# Patient Record
Sex: FEMALE | Race: WHITE | NOT HISPANIC OR LATINO | Employment: OTHER | URBAN - METROPOLITAN AREA
[De-identification: names, ages, dates, MRNs, and addresses within clinical notes are randomized per-mention and may not be internally consistent; named-entity substitution may affect disease eponyms.]

---

## 2017-01-12 ENCOUNTER — GENERIC CONVERSION - ENCOUNTER (OUTPATIENT)
Dept: OTHER | Facility: OTHER | Age: 62
End: 2017-01-12

## 2017-02-16 ENCOUNTER — ANESTHESIA EVENT (OUTPATIENT)
Dept: PERIOP | Facility: HOSPITAL | Age: 62
DRG: 620 | End: 2017-02-16
Payer: COMMERCIAL

## 2017-02-21 ENCOUNTER — GENERIC CONVERSION - ENCOUNTER (OUTPATIENT)
Dept: OTHER | Facility: OTHER | Age: 62
End: 2017-02-21

## 2017-02-23 ENCOUNTER — ALLSCRIPTS OFFICE VISIT (OUTPATIENT)
Dept: OTHER | Facility: OTHER | Age: 62
End: 2017-02-23

## 2017-02-28 ENCOUNTER — ANESTHESIA (OUTPATIENT)
Dept: PERIOP | Facility: HOSPITAL | Age: 62
DRG: 620 | End: 2017-02-28
Payer: COMMERCIAL

## 2017-02-28 ENCOUNTER — HOSPITAL ENCOUNTER (INPATIENT)
Facility: HOSPITAL | Age: 62
LOS: 2 days | Discharge: HOME | DRG: 620 | End: 2017-03-02
Attending: SURGERY | Admitting: SURGERY
Payer: COMMERCIAL

## 2017-02-28 DIAGNOSIS — I10 ESSENTIAL HYPERTENSION: Primary | ICD-10-CM

## 2017-02-28 DIAGNOSIS — E66.01 MORBID (SEVERE) OBESITY DUE TO EXCESS CALORIES (HCC): ICD-10-CM

## 2017-02-28 DIAGNOSIS — E78.5 HYPERLIPIDEMIA: ICD-10-CM

## 2017-02-28 PROCEDURE — 0BUR4JZ SUPPLEMENT R DIAPHRAGM WITH SYNTH SUB, PERC ENDO APPROACH: ICD-10-PCS | Performed by: SURGERY

## 2017-02-28 PROCEDURE — 88307 TISSUE EXAM BY PATHOLOGIST: CPT | Performed by: SURGERY

## 2017-02-28 PROCEDURE — 0BUS4JZ SUPPLEMENT LEFT DIAPHRAGM WITH SYNTH SUB, PERC ENDO APPROACH: ICD-10-PCS | Performed by: SURGERY

## 2017-02-28 PROCEDURE — C1781 MESH (IMPLANTABLE): HCPCS | Performed by: SURGERY

## 2017-02-28 PROCEDURE — 0DB64Z3 EXCISION OF STOMACH, PERCUTANEOUS ENDOSCOPIC APPROACH, VERTICAL: ICD-10-PCS | Performed by: SURGERY

## 2017-02-28 PROCEDURE — 0DJ08ZZ INSPECTION OF UPPER INTESTINAL TRACT, VIA NATURAL OR ARTIFICIAL OPENING ENDOSCOPIC: ICD-10-PCS | Performed by: SURGERY

## 2017-02-28 DEVICE — MESH BIO-A MESH GORE: Type: IMPLANTABLE DEVICE | Site: ESOPHAGUS | Status: FUNCTIONAL

## 2017-02-28 RX ORDER — MORPHINE SULFATE 4 MG/ML
4 INJECTION, SOLUTION INTRAMUSCULAR; INTRAVENOUS EVERY 2 HOUR PRN
Status: DISCONTINUED | OUTPATIENT
Start: 2017-02-28 | End: 2017-03-02 | Stop reason: HOSPADM

## 2017-02-28 RX ORDER — SODIUM CHLORIDE 9 MG/ML
125 INJECTION, SOLUTION INTRAVENOUS CONTINUOUS
Status: DISCONTINUED | OUTPATIENT
Start: 2017-02-28 | End: 2017-02-28

## 2017-02-28 RX ORDER — BIOTIN 1 MG
2000 TABLET ORAL EVERY EVENING
COMMUNITY
End: 2018-03-05 | Stop reason: ALTCHOICE

## 2017-02-28 RX ORDER — ONDANSETRON 2 MG/ML
4 INJECTION INTRAMUSCULAR; INTRAVENOUS ONCE AS NEEDED
Status: COMPLETED | OUTPATIENT
Start: 2017-02-28 | End: 2017-02-28

## 2017-02-28 RX ORDER — SODIUM CHLORIDE, SODIUM LACTATE, POTASSIUM CHLORIDE, CALCIUM CHLORIDE 600; 310; 30; 20 MG/100ML; MG/100ML; MG/100ML; MG/100ML
125 INJECTION, SOLUTION INTRAVENOUS CONTINUOUS
Status: DISCONTINUED | OUTPATIENT
Start: 2017-02-28 | End: 2017-03-01

## 2017-02-28 RX ORDER — LIDOCAINE HYDROCHLORIDE 10 MG/ML
INJECTION, SOLUTION INFILTRATION; PERINEURAL AS NEEDED
Status: DISCONTINUED | OUTPATIENT
Start: 2017-02-28 | End: 2017-02-28 | Stop reason: SURG

## 2017-02-28 RX ORDER — OXYCODONE HCL 5 MG/5 ML
5 SOLUTION, ORAL ORAL EVERY 4 HOURS PRN
Status: DISCONTINUED | OUTPATIENT
Start: 2017-02-28 | End: 2017-03-02 | Stop reason: HOSPADM

## 2017-02-28 RX ORDER — METOCLOPRAMIDE HYDROCHLORIDE 5 MG/ML
INJECTION INTRAMUSCULAR; INTRAVENOUS AS NEEDED
Status: DISCONTINUED | OUTPATIENT
Start: 2017-02-28 | End: 2017-02-28 | Stop reason: SURG

## 2017-02-28 RX ORDER — BUPIVACAINE HYDROCHLORIDE AND EPINEPHRINE 5; 5 MG/ML; UG/ML
INJECTION, SOLUTION PERINEURAL AS NEEDED
Status: DISCONTINUED | OUTPATIENT
Start: 2017-02-28 | End: 2017-02-28 | Stop reason: HOSPADM

## 2017-02-28 RX ORDER — HYDROMORPHONE HYDROCHLORIDE 2 MG/ML
INJECTION, SOLUTION INTRAMUSCULAR; INTRAVENOUS; SUBCUTANEOUS AS NEEDED
Status: DISCONTINUED | OUTPATIENT
Start: 2017-02-28 | End: 2017-02-28 | Stop reason: SURG

## 2017-02-28 RX ORDER — ONDANSETRON 2 MG/ML
4 INJECTION INTRAMUSCULAR; INTRAVENOUS EVERY 4 HOURS PRN
Status: DISCONTINUED | OUTPATIENT
Start: 2017-02-28 | End: 2017-03-02 | Stop reason: HOSPADM

## 2017-02-28 RX ORDER — ROCURONIUM BROMIDE 10 MG/ML
INJECTION, SOLUTION INTRAVENOUS AS NEEDED
Status: DISCONTINUED | OUTPATIENT
Start: 2017-02-28 | End: 2017-02-28 | Stop reason: SURG

## 2017-02-28 RX ORDER — FENTANYL CITRATE/PF 50 MCG/ML
50 SYRINGE (ML) INJECTION
Status: DISCONTINUED | OUTPATIENT
Start: 2017-02-28 | End: 2017-02-28 | Stop reason: HOSPADM

## 2017-02-28 RX ORDER — OXYCODONE HCL 5 MG/5 ML
10 SOLUTION, ORAL ORAL EVERY 4 HOURS PRN
Status: DISCONTINUED | OUTPATIENT
Start: 2017-02-28 | End: 2017-03-02 | Stop reason: HOSPADM

## 2017-02-28 RX ORDER — PROMETHAZINE HYDROCHLORIDE 25 MG/ML
25 INJECTION, SOLUTION INTRAMUSCULAR; INTRAVENOUS EVERY 4 HOURS PRN
Status: DISCONTINUED | OUTPATIENT
Start: 2017-02-28 | End: 2017-03-02 | Stop reason: HOSPADM

## 2017-02-28 RX ORDER — PANTOPRAZOLE SODIUM 40 MG/1
40 INJECTION, POWDER, FOR SOLUTION INTRAVENOUS
Status: DISCONTINUED | OUTPATIENT
Start: 2017-03-01 | End: 2017-03-02 | Stop reason: HOSPADM

## 2017-02-28 RX ORDER — ACETAMINOPHEN 160 MG/5ML
320 SUSPENSION, ORAL (FINAL DOSE FORM) ORAL EVERY 4 HOURS PRN
Status: DISCONTINUED | OUTPATIENT
Start: 2017-02-28 | End: 2017-03-02 | Stop reason: HOSPADM

## 2017-02-28 RX ORDER — GLYCOPYRROLATE 0.2 MG/ML
INJECTION INTRAMUSCULAR; INTRAVENOUS AS NEEDED
Status: DISCONTINUED | OUTPATIENT
Start: 2017-02-28 | End: 2017-02-28 | Stop reason: SURG

## 2017-02-28 RX ORDER — ACETAMINOPHEN 160 MG/5ML
325 SUSPENSION, ORAL (FINAL DOSE FORM) ORAL EVERY 4 HOURS PRN
Status: DISCONTINUED | OUTPATIENT
Start: 2017-02-28 | End: 2017-03-02 | Stop reason: HOSPADM

## 2017-02-28 RX ORDER — FENTANYL CITRATE 50 UG/ML
INJECTION, SOLUTION INTRAMUSCULAR; INTRAVENOUS AS NEEDED
Status: DISCONTINUED | OUTPATIENT
Start: 2017-02-28 | End: 2017-02-28 | Stop reason: SURG

## 2017-02-28 RX ORDER — PROPOFOL 10 MG/ML
INJECTION, EMULSION INTRAVENOUS AS NEEDED
Status: DISCONTINUED | OUTPATIENT
Start: 2017-02-28 | End: 2017-02-28 | Stop reason: SURG

## 2017-02-28 RX ORDER — SCOLOPAMINE TRANSDERMAL SYSTEM 1 MG/1
1 PATCH, EXTENDED RELEASE TRANSDERMAL ONCE AS NEEDED
Status: DISCONTINUED | OUTPATIENT
Start: 2017-02-28 | End: 2017-02-28

## 2017-02-28 RX ORDER — ALBUTEROL SULFATE 2.5 MG/3ML
2.5 SOLUTION RESPIRATORY (INHALATION) ONCE AS NEEDED
Status: DISCONTINUED | OUTPATIENT
Start: 2017-02-28 | End: 2017-02-28 | Stop reason: HOSPADM

## 2017-02-28 RX ORDER — MAGNESIUM HYDROXIDE 1200 MG/15ML
LIQUID ORAL AS NEEDED
Status: DISCONTINUED | OUTPATIENT
Start: 2017-02-28 | End: 2017-02-28 | Stop reason: HOSPADM

## 2017-02-28 RX ORDER — MEPERIDINE HYDROCHLORIDE 50 MG/ML
12.5 INJECTION INTRAMUSCULAR; INTRAVENOUS; SUBCUTANEOUS AS NEEDED
Status: DISCONTINUED | OUTPATIENT
Start: 2017-02-28 | End: 2017-02-28 | Stop reason: HOSPADM

## 2017-02-28 RX ORDER — MORPHINE SULFATE 2 MG/ML
2 INJECTION, SOLUTION INTRAMUSCULAR; INTRAVENOUS EVERY 2 HOUR PRN
Status: DISCONTINUED | OUTPATIENT
Start: 2017-02-28 | End: 2017-03-02 | Stop reason: HOSPADM

## 2017-02-28 RX ORDER — MIDAZOLAM HYDROCHLORIDE 1 MG/ML
INJECTION INTRAMUSCULAR; INTRAVENOUS AS NEEDED
Status: DISCONTINUED | OUTPATIENT
Start: 2017-02-28 | End: 2017-02-28 | Stop reason: SURG

## 2017-02-28 RX ADMIN — HYDROMORPHONE HYDROCHLORIDE 0.4 MG: 2 INJECTION, SOLUTION INTRAMUSCULAR; INTRAVENOUS; SUBCUTANEOUS at 14:13

## 2017-02-28 RX ADMIN — OXYCODONE HYDROCHLORIDE 5 MG: 5 SOLUTION ORAL at 21:39

## 2017-02-28 RX ADMIN — CEFAZOLIN SODIUM 2000 MG: 2 SOLUTION INTRAVENOUS at 13:09

## 2017-02-28 RX ADMIN — DEXAMETHASONE SODIUM PHOSPHATE 4 MG: 10 INJECTION INTRAMUSCULAR; INTRAVENOUS at 13:07

## 2017-02-28 RX ADMIN — SODIUM CHLORIDE 125 ML/HR: 0.9 INJECTION, SOLUTION INTRAVENOUS at 12:40

## 2017-02-28 RX ADMIN — GLYCOPYRROLATE 0.1 MG: 0.2 INJECTION, SOLUTION INTRAMUSCULAR; INTRAVENOUS at 12:56

## 2017-02-28 RX ADMIN — GLYCOPYRROLATE 0.8 MG: 0.2 INJECTION, SOLUTION INTRAMUSCULAR; INTRAVENOUS at 14:31

## 2017-02-28 RX ADMIN — ROCURONIUM BROMIDE 20 MG: 10 INJECTION, SOLUTION INTRAVENOUS at 13:58

## 2017-02-28 RX ADMIN — MIDAZOLAM HYDROCHLORIDE 2 MG: 1 INJECTION, SOLUTION INTRAMUSCULAR; INTRAVENOUS at 12:53

## 2017-02-28 RX ADMIN — ACETAMINOPHEN 324.8 MG: 160 SUSPENSION ORAL at 21:39

## 2017-02-28 RX ADMIN — FENTANYL CITRATE 100 MCG: 50 INJECTION, SOLUTION INTRAMUSCULAR; INTRAVENOUS at 12:55

## 2017-02-28 RX ADMIN — HYDROMORPHONE HYDROCHLORIDE 0.4 MG: 2 INJECTION, SOLUTION INTRAMUSCULAR; INTRAVENOUS; SUBCUTANEOUS at 13:58

## 2017-02-28 RX ADMIN — METRONIDAZOLE 500 MG: 500 SOLUTION INTRAVENOUS at 13:13

## 2017-02-28 RX ADMIN — ENOXAPARIN SODIUM 40 MG: 40 INJECTION SUBCUTANEOUS at 12:21

## 2017-02-28 RX ADMIN — SCOPALAMINE 1 PATCH: 1 PATCH, EXTENDED RELEASE TRANSDERMAL at 12:22

## 2017-02-28 RX ADMIN — PROPOFOL 200 MG: 10 INJECTION, EMULSION INTRAVENOUS at 12:56

## 2017-02-28 RX ADMIN — NEOSTIGMINE METHYLSULFATE 4 MG: 1 INJECTION INTRAMUSCULAR; INTRAVENOUS; SUBCUTANEOUS at 14:33

## 2017-02-28 RX ADMIN — ROCURONIUM BROMIDE 50 MG: 10 INJECTION, SOLUTION INTRAVENOUS at 12:56

## 2017-02-28 RX ADMIN — SODIUM CHLORIDE, SODIUM LACTATE, POTASSIUM CHLORIDE, AND CALCIUM CHLORIDE 125 ML/HR: .6; .31; .03; .02 INJECTION, SOLUTION INTRAVENOUS at 15:47

## 2017-02-28 RX ADMIN — ONDANSETRON HYDROCHLORIDE 4 MG: 2 INJECTION, SOLUTION INTRAVENOUS at 14:28

## 2017-02-28 RX ADMIN — LIDOCAINE HYDROCHLORIDE 100 MG: 10 INJECTION, SOLUTION INFILTRATION; PERINEURAL at 12:56

## 2017-02-28 RX ADMIN — HYDROMORPHONE HYDROCHLORIDE 0.4 MG: 2 INJECTION, SOLUTION INTRAMUSCULAR; INTRAVENOUS; SUBCUTANEOUS at 13:32

## 2017-02-28 RX ADMIN — HYDROMORPHONE HYDROCHLORIDE 0.4 MG: 2 INJECTION, SOLUTION INTRAMUSCULAR; INTRAVENOUS; SUBCUTANEOUS at 14:23

## 2017-02-28 RX ADMIN — METOCLOPRAMIDE HYDROCHLORIDE 10 MG: 5 INJECTION INTRAMUSCULAR; INTRAVENOUS at 12:56

## 2017-02-28 RX ADMIN — FENTANYL CITRATE 50 MCG: 50 INJECTION INTRAMUSCULAR; INTRAVENOUS at 15:25

## 2017-03-01 PROBLEM — E66.01 MORBID OBESITY DUE TO EXCESS CALORIES (HCC): Status: ACTIVE | Noted: 2017-03-01

## 2017-03-01 LAB
ANION GAP SERPL CALCULATED.3IONS-SCNC: 10 MMOL/L (ref 4–13)
BUN SERPL-MCNC: 8 MG/DL (ref 5–25)
CALCIUM SERPL-MCNC: 8.4 MG/DL (ref 8.3–10.1)
CHLORIDE SERPL-SCNC: 103 MMOL/L (ref 100–108)
CO2 SERPL-SCNC: 26 MMOL/L (ref 21–32)
CREAT SERPL-MCNC: 0.53 MG/DL (ref 0.6–1.3)
ERYTHROCYTE [DISTWIDTH] IN BLOOD BY AUTOMATED COUNT: 12.3 % (ref 11.6–15.1)
GFR SERPL CREATININE-BSD FRML MDRD: >60 ML/MIN/1.73SQ M
GLUCOSE SERPL-MCNC: 116 MG/DL (ref 65–140)
HCT VFR BLD AUTO: 36.8 % (ref 34.8–46.1)
HGB BLD-MCNC: 12.6 G/DL (ref 11.5–15.4)
MCH RBC QN AUTO: 31.1 PG (ref 26.8–34.3)
MCHC RBC AUTO-ENTMCNC: 34.2 G/DL (ref 31.4–37.4)
MCV RBC AUTO: 91 FL (ref 82–98)
PLATELET # BLD AUTO: 222 THOUSANDS/UL (ref 149–390)
PMV BLD AUTO: 10.5 FL (ref 8.9–12.7)
POTASSIUM SERPL-SCNC: 3.6 MMOL/L (ref 3.5–5.3)
RBC # BLD AUTO: 4.05 MILLION/UL (ref 3.81–5.12)
SODIUM SERPL-SCNC: 139 MMOL/L (ref 136–145)
WBC # BLD AUTO: 9.51 THOUSAND/UL (ref 4.31–10.16)

## 2017-03-01 PROCEDURE — 85027 COMPLETE CBC AUTOMATED: CPT | Performed by: SURGERY

## 2017-03-01 PROCEDURE — 80048 BASIC METABOLIC PNL TOTAL CA: CPT | Performed by: SURGERY

## 2017-03-01 PROCEDURE — C9113 INJ PANTOPRAZOLE SODIUM, VIA: HCPCS | Performed by: SURGERY

## 2017-03-01 RX ORDER — HYDRALAZINE HYDROCHLORIDE 20 MG/ML
10 INJECTION INTRAMUSCULAR; INTRAVENOUS EVERY 6 HOURS PRN
Status: DISCONTINUED | OUTPATIENT
Start: 2017-03-01 | End: 2017-03-01

## 2017-03-01 RX ORDER — TAMSULOSIN HYDROCHLORIDE 0.4 MG/1
0.4 CAPSULE ORAL DAILY
Status: COMPLETED | OUTPATIENT
Start: 2017-03-01 | End: 2017-03-01

## 2017-03-01 RX ORDER — TAMSULOSIN HYDROCHLORIDE 0.4 MG/1
0.4 CAPSULE ORAL DAILY
Status: DISCONTINUED | OUTPATIENT
Start: 2017-03-02 | End: 2017-03-02

## 2017-03-01 RX ORDER — LABETALOL HYDROCHLORIDE 5 MG/ML
10 INJECTION, SOLUTION INTRAVENOUS EVERY 4 HOURS PRN
Status: DISCONTINUED | OUTPATIENT
Start: 2017-03-01 | End: 2017-03-02 | Stop reason: HOSPADM

## 2017-03-01 RX ADMIN — PANTOPRAZOLE SODIUM 40 MG: 40 INJECTION, POWDER, FOR SOLUTION INTRAVENOUS at 09:49

## 2017-03-01 RX ADMIN — OXYCODONE HYDROCHLORIDE 5 MG: 5 SOLUTION ORAL at 09:48

## 2017-03-01 RX ADMIN — OXYCODONE HYDROCHLORIDE 5 MG: 5 SOLUTION ORAL at 05:04

## 2017-03-01 RX ADMIN — ACETAMINOPHEN 325 MG: 160 SUSPENSION ORAL at 05:03

## 2017-03-01 RX ADMIN — ACETAMINOPHEN 325 MG: 160 SUSPENSION ORAL at 23:36

## 2017-03-01 RX ADMIN — SODIUM CHLORIDE, SODIUM LACTATE, POTASSIUM CHLORIDE, AND CALCIUM CHLORIDE 125 ML/HR: .6; .31; .03; .02 INJECTION, SOLUTION INTRAVENOUS at 06:38

## 2017-03-01 RX ADMIN — ENOXAPARIN SODIUM 40 MG: 40 INJECTION SUBCUTANEOUS at 09:48

## 2017-03-01 RX ADMIN — OXYCODONE HYDROCHLORIDE 5 MG: 5 SOLUTION ORAL at 18:08

## 2017-03-01 RX ADMIN — TAMSULOSIN HYDROCHLORIDE 0.4 MG: 0.4 CAPSULE ORAL at 09:50

## 2017-03-01 RX ADMIN — OXYCODONE HYDROCHLORIDE 5 MG: 5 SOLUTION ORAL at 23:36

## 2017-03-01 RX ADMIN — ACETAMINOPHEN 325 MG: 160 SUSPENSION ORAL at 18:08

## 2017-03-01 RX ADMIN — ACETAMINOPHEN 325 MG: 160 SUSPENSION ORAL at 09:48

## 2017-03-02 ENCOUNTER — GENERIC CONVERSION - ENCOUNTER (OUTPATIENT)
Dept: OTHER | Facility: OTHER | Age: 62
End: 2017-03-02

## 2017-03-02 VITALS
OXYGEN SATURATION: 95 % | SYSTOLIC BLOOD PRESSURE: 157 MMHG | DIASTOLIC BLOOD PRESSURE: 76 MMHG | HEIGHT: 62 IN | HEART RATE: 107 BPM | BODY MASS INDEX: 39.51 KG/M2 | TEMPERATURE: 97.6 F | WEIGHT: 214.7 LBS | RESPIRATION RATE: 18 BRPM

## 2017-03-02 PROCEDURE — 0T9B70Z DRAINAGE OF BLADDER WITH DRAINAGE DEVICE, VIA NATURAL OR ARTIFICIAL OPENING: ICD-10-PCS | Performed by: SURGERY

## 2017-03-02 PROCEDURE — C9113 INJ PANTOPRAZOLE SODIUM, VIA: HCPCS | Performed by: SURGERY

## 2017-03-02 RX ORDER — OMEPRAZOLE 20 MG/1
20 CAPSULE, DELAYED RELEASE ORAL DAILY
Qty: 30 CAPSULE | Refills: 0
Start: 2017-03-02 | End: 2018-09-12 | Stop reason: ALTCHOICE

## 2017-03-02 RX ORDER — OXYBUTYNIN CHLORIDE 5 MG/1
5 TABLET ORAL 2 TIMES DAILY
Status: DISCONTINUED | OUTPATIENT
Start: 2017-03-02 | End: 2017-03-02 | Stop reason: HOSPADM

## 2017-03-02 RX ORDER — OXYCODONE HYDROCHLORIDE AND ACETAMINOPHEN 5; 325 MG/1; MG/1
1 TABLET ORAL EVERY 4 HOURS PRN
Qty: 30 TABLET | Refills: 0
Start: 2017-03-02 | End: 2018-03-05 | Stop reason: ALTCHOICE

## 2017-03-02 RX ORDER — TAMSULOSIN HYDROCHLORIDE 0.4 MG/1
0.4 CAPSULE ORAL
Qty: 30 CAPSULE | Refills: 0 | Status: SHIPPED | OUTPATIENT
Start: 2017-03-02 | End: 2018-03-05 | Stop reason: SDUPTHER

## 2017-03-02 RX ORDER — TAMSULOSIN HYDROCHLORIDE 0.4 MG/1
0.4 CAPSULE ORAL DAILY
Status: COMPLETED | OUTPATIENT
Start: 2017-03-02 | End: 2017-03-02

## 2017-03-02 RX ORDER — OXYBUTYNIN CHLORIDE 5 MG/1
10 TABLET, EXTENDED RELEASE ORAL DAILY
Status: DISCONTINUED | OUTPATIENT
Start: 2017-03-02 | End: 2017-03-02

## 2017-03-02 RX ORDER — TAMSULOSIN HYDROCHLORIDE 0.4 MG/1
0.4 CAPSULE ORAL DAILY
Status: DISCONTINUED | OUTPATIENT
Start: 2017-03-02 | End: 2017-03-02

## 2017-03-02 RX ORDER — OXYBUTYNIN CHLORIDE 5 MG/1
5 TABLET ORAL 2 TIMES DAILY
Qty: 60 TABLET | Refills: 0 | Status: SHIPPED | OUTPATIENT
Start: 2017-03-02 | End: 2017-03-02 | Stop reason: HOSPADM

## 2017-03-02 RX ADMIN — ENOXAPARIN SODIUM 40 MG: 40 INJECTION SUBCUTANEOUS at 08:38

## 2017-03-02 RX ADMIN — TAMSULOSIN HYDROCHLORIDE 0.4 MG: 0.4 CAPSULE ORAL at 08:38

## 2017-03-02 RX ADMIN — OXYBUTYNIN CHLORIDE 5 MG: 5 TABLET ORAL at 14:57

## 2017-03-02 RX ADMIN — OXYCODONE HYDROCHLORIDE 5 MG: 5 SOLUTION ORAL at 08:56

## 2017-03-02 RX ADMIN — ACETAMINOPHEN 325 MG: 160 SUSPENSION ORAL at 08:56

## 2017-03-02 RX ADMIN — PANTOPRAZOLE SODIUM 40 MG: 40 INJECTION, POWDER, FOR SOLUTION INTRAVENOUS at 08:38

## 2017-03-03 ENCOUNTER — GENERIC CONVERSION - ENCOUNTER (OUTPATIENT)
Dept: OTHER | Facility: OTHER | Age: 62
End: 2017-03-03

## 2017-03-09 ENCOUNTER — ALLSCRIPTS OFFICE VISIT (OUTPATIENT)
Dept: OTHER | Facility: OTHER | Age: 62
End: 2017-03-09

## 2017-04-11 ENCOUNTER — GENERIC CONVERSION - ENCOUNTER (OUTPATIENT)
Dept: OTHER | Facility: OTHER | Age: 62
End: 2017-04-11

## 2017-06-13 ENCOUNTER — ALLSCRIPTS OFFICE VISIT (OUTPATIENT)
Dept: OTHER | Facility: OTHER | Age: 62
End: 2017-06-13

## 2017-06-13 DIAGNOSIS — Z98.84 BARIATRIC SURGERY STATUS: ICD-10-CM

## 2017-06-13 DIAGNOSIS — K91.2 POSTSURGICAL MALABSORPTION, NOT ELSEWHERE CLASSIFIED: ICD-10-CM

## 2017-06-13 DIAGNOSIS — E78.5 HYPERLIPIDEMIA: ICD-10-CM

## 2017-06-13 DIAGNOSIS — E66.9 OBESITY: ICD-10-CM

## 2017-06-13 DIAGNOSIS — M81.0 AGE-RELATED OSTEOPOROSIS WITHOUT CURRENT PATHOLOGICAL FRACTURE: ICD-10-CM

## 2017-06-13 DIAGNOSIS — E55.9 VITAMIN D DEFICIENCY: ICD-10-CM

## 2017-07-15 ENCOUNTER — ALLSCRIPTS OFFICE VISIT (OUTPATIENT)
Dept: OTHER | Facility: OTHER | Age: 62
End: 2017-07-15

## 2017-07-15 ENCOUNTER — LAB REQUISITION (OUTPATIENT)
Dept: LAB | Facility: HOSPITAL | Age: 62
End: 2017-07-15
Payer: COMMERCIAL

## 2017-07-15 DIAGNOSIS — J02.9 ACUTE PHARYNGITIS: ICD-10-CM

## 2017-07-15 LAB — S PYO AG THROAT QL: NEGATIVE

## 2017-07-15 PROCEDURE — 87070 CULTURE OTHR SPECIMN AEROBIC: CPT | Performed by: FAMILY MEDICINE

## 2017-07-17 LAB — BACTERIA THROAT CULT: NORMAL

## 2017-08-17 ENCOUNTER — APPOINTMENT (OUTPATIENT)
Dept: LAB | Facility: CLINIC | Age: 62
End: 2017-08-17
Payer: COMMERCIAL

## 2017-08-17 PROCEDURE — 82306 VITAMIN D 25 HYDROXY: CPT

## 2017-08-17 PROCEDURE — 85027 COMPLETE CBC AUTOMATED: CPT

## 2017-08-17 PROCEDURE — 84425 ASSAY OF VITAMIN B-1: CPT

## 2017-08-17 PROCEDURE — 80061 LIPID PANEL: CPT

## 2017-08-17 PROCEDURE — 82607 VITAMIN B-12: CPT

## 2017-08-17 PROCEDURE — 82746 ASSAY OF FOLIC ACID SERUM: CPT

## 2017-08-17 PROCEDURE — 84590 ASSAY OF VITAMIN A: CPT

## 2017-08-17 PROCEDURE — 80053 COMPREHEN METABOLIC PANEL: CPT

## 2017-08-17 PROCEDURE — 82728 ASSAY OF FERRITIN: CPT

## 2017-08-17 PROCEDURE — 36415 COLL VENOUS BLD VENIPUNCTURE: CPT

## 2017-08-17 PROCEDURE — 83970 ASSAY OF PARATHORMONE: CPT

## 2017-08-29 ENCOUNTER — GENERIC CONVERSION - ENCOUNTER (OUTPATIENT)
Dept: OTHER | Facility: OTHER | Age: 62
End: 2017-08-29

## 2017-09-08 ENCOUNTER — GENERIC CONVERSION - ENCOUNTER (OUTPATIENT)
Dept: OTHER | Facility: OTHER | Age: 62
End: 2017-09-08

## 2017-09-13 ENCOUNTER — ALLSCRIPTS OFFICE VISIT (OUTPATIENT)
Dept: OTHER | Facility: OTHER | Age: 62
End: 2017-09-13

## 2017-09-21 ENCOUNTER — TRANSCRIBE ORDERS (OUTPATIENT)
Dept: ADMINISTRATIVE | Facility: HOSPITAL | Age: 62
End: 2017-09-21

## 2017-09-21 DIAGNOSIS — Z12.31 ENCOUNTER FOR SCREENING MAMMOGRAM FOR MALIGNANT NEOPLASM OF BREAST: ICD-10-CM

## 2017-09-21 DIAGNOSIS — Z12.31 ENCOUNTER FOR SCREENING MAMMOGRAM FOR MALIGNANT NEOPLASM OF BREAST: Primary | ICD-10-CM

## 2017-10-26 ENCOUNTER — HOSPITAL ENCOUNTER (OUTPATIENT)
Dept: BONE DENSITY | Facility: IMAGING CENTER | Age: 62
Discharge: HOME | End: 2017-10-26
Payer: COMMERCIAL

## 2017-10-26 DIAGNOSIS — Z12.31 ENCOUNTER FOR SCREENING MAMMOGRAM FOR MALIGNANT NEOPLASM OF BREAST: ICD-10-CM

## 2017-10-26 PROCEDURE — G0202 SCR MAMMO BI INCL CAD: HCPCS

## 2017-11-09 ENCOUNTER — LAB REQUISITION (OUTPATIENT)
Dept: LAB | Facility: HOSPITAL | Age: 62
End: 2017-11-09
Payer: COMMERCIAL

## 2017-11-09 ENCOUNTER — GENERIC CONVERSION - ENCOUNTER (OUTPATIENT)
Dept: OTHER | Facility: OTHER | Age: 62
End: 2017-11-09

## 2017-11-09 DIAGNOSIS — N39.0 URINARY TRACT INFECTION: ICD-10-CM

## 2017-11-09 LAB
BILIRUB UR QL STRIP: NORMAL
CLARITY UR: NORMAL
COLOR UR: YELLOW
GLUCOSE (HISTORICAL): NORMAL
HGB UR QL STRIP.AUTO: NORMAL
KETONES UR STRIP-MCNC: NORMAL MG/DL
LEUKOCYTE ESTERASE UR QL STRIP: NORMAL
NITRITE UR QL STRIP: NORMAL
PH UR STRIP.AUTO: 6 [PH]
PROT UR STRIP-MCNC: NORMAL MG/DL
SP GR UR STRIP.AUTO: 1.01
UROBILINOGEN UR QL STRIP.AUTO: 0.2

## 2017-11-09 PROCEDURE — 87086 URINE CULTURE/COLONY COUNT: CPT | Performed by: NURSE PRACTITIONER

## 2017-11-09 PROCEDURE — 87186 SC STD MICRODIL/AGAR DIL: CPT | Performed by: NURSE PRACTITIONER

## 2017-11-09 PROCEDURE — 87077 CULTURE AEROBIC IDENTIFY: CPT | Performed by: NURSE PRACTITIONER

## 2017-11-11 LAB — BACTERIA UR CULT: ABNORMAL

## 2018-01-10 NOTE — MISCELLANEOUS
Message   Recorded as Task   Date: 01/29/2016 08:07 AM, Created By: Rose Mary Rehman   Task Name: Call Back   Assigned To: Jose Matthew   Regarding Patient: Christine Bond, Status: Active   CommentCarmon Schoenchen - 29 Jan 2016 8:07 AM     TASK CREATED  Please call patient  Her bone density shows worsening of the density of her bones  She needs an office visit with me or Nataliia Sagastume or any provider to discuss treatment options     Mala Martinez - 29 Jan 2016 12:03 PM     TASK REPLIED TO: Previously Assigned To Mala Martinez  Pt was imformed and an appt was made with Emily Holbrook on 02/01/2016 @ 10:15am  MRP        Signatures   Electronically signed by : Pattie Santoyo DO; Jan 30 2016  5:38PM EST                       (Author)

## 2018-01-10 NOTE — PROGRESS NOTES
Chief Complaint  Chief Complaint Free Text Note Form: Patient was called for her pre-operative check-in  She verbalized no concerns or questions at this time related to implementing behaviors before and after surgery  She reports compliancy with her recommended weight loss goals  She verbalized a positive support system, but states it will be difficult at times due to being her 's caretaker  She has begun meal planning for after surgery  Verbalized many health related motivations for sticking to her new learned behaviors  She appeared anxious about the surgery  Her fears were normalized and assuaged by reiterating the surgeon's expert skills  Counselor helped her reduce negative thoughts contributing to her anxiety  She states she will email her dietitian with dietary questions related to going on vacation in May  She was encouraged to check-in at any time with behavioral, dietary, or medical concerns for continued support throughout this process  Active Problems    1  Allergic rhinitis (477 9) (J30 9)   2  Annual physical exam (V70 0) (Z00 00)   3  Blood tests prior to treatment or procedure (V72 63) (Z01 812)   4  Closed Infrasyndesmotic Fracture Of The Left Lateral Malleolus With Posterior Medial   Fracture (Crispin-Mathew A3) (824 6)   5  Colon cancer screening (V76 51) (Z12 11)   6  Edema (782 3) (R60 9)   7  Elevated blood pressure reading without diagnosis of hypertension (796 2) (R03 0)   8  Encounter for routine gynecological examination (V72 31) (Z01 419)   9  Encounter for screening colonoscopy (V76 51) (Z12 11)   10  Encounter for screening mammogram for malignant neoplasm of breast (V76 12)    (Z12 31)   11  Generalized osteoarthritis of multiple sites (715 09) (M15 9)   12  Hyperlipidemia (272 4) (E78 5)   13  Morbid obesity (278 01) (E66 01)   14  Need for prophylactic vaccination and inoculation against influenza (V04 81) (Z23)   15  Osteoarthritis of knee (715 36) (M17 9)   16  Osteoporosis (733 00) (M81 0)   17  Other urinary incontinence (788 39) (N39 498)   18  Pes planus, unspecified laterality (734) (M21 40)   19  Preop cardiovascular exam (V72 81) (Z01 810)   20  Rosacea (695 3) (L71 9)   21  Sciatic pain, left (724 3) (M54 32)   22  Screening for osteoporosis (V82 81) (Z13 820)   23  Screening for tuberculosis (V74 1) (Z11 1)   24  Strain of thoracic region, initial encounter (847 1) (S29 019A)   25  Strain, back (847 9) (S39 012A)   26  Urinary tract infection (599 0) (N39 0)   27  Vitamin D deficiency (268 9) (E55 9)    Past Medical History    1  History of Acute bronchitis (466 0) (J20 9)   2  Acute sinusitis (461 9) (J01 90)   3  History of Acute upper respiratory infection (465 9) (J06 9)   4  History of Chronic Primary Lymphadenitis (289 1)   5  History of acute pharyngitis (V12 69) (Z87 09)   6  History of acute sinusitis (V12 69) (Z87 09)   7  History of osteoporosis (V13 59) (Z87 39)   8  History of Hordeolum externum, unspecified laterality (373 11) (H00 019)   9  Need for prophylactic vaccination and inoculation against influenza (V04 81) (Z23)   10  History of Synovial Cyst (727 40)   11  History of Unspecified Muscle Strain (848 9)   12  History of Urinary Tract Infection (V13 02)    Surgical History    1  History of Foot Surgery   2  History of Knee Surgery   3  History of Neuroplasty Decompression Median Nerve At Carpal Tunnel   4  History of Rotator Cuff Repair   5  History of Salpingo-oophorectomy Bilateral Removal Of Solitary Ovary And Tube    Family History  Mother    1  Family history of Mother  At Age 76  Father    2  Family history of Father  At Age 60   1  Family history of Heart Disease (V17 49)  Family History    4  Family history of Cancer   5  Family history of Endometriosis   6   Family history of Osteoporosis (V17 81)    Social History    · Alcohol Use (History)   · Caffeine use (V49 89) (F15 90)   · Former smoker (I36 89) (K02 597)   · No drug use   · Denied: History of Uses Safety Equipment   · Uses Safety Equipment - Seatbelts    Current Meds   1  Allegra Allergy 180 MG Oral Tablet; TAKE 1 TABLET DAILY AS NEEDED FOR ALLERGIES; Therapy: 88NFH2799 to Recorded   2  Biotin 5000 MCG Oral Capsule; Therapy: (Recorded:28Nov2016) to Recorded   3  Caltrate 600+D TABS; Therapy: (Recorded:28Nov2016) to Recorded   4  Cranberry TABS; Therapy: (Recorded:28Nov2016) to Recorded   5  HydroCHLOROthiazide 12 5 MG Oral Capsule; TAKE ONE CAPSULE BY MOUTH EVERY   DAY; Therapy: 59HFH9348 to (Evaluate:11Aug2017)  Requested for: 11VMX4386; Last   Rx:13Jan2017 Ordered   6  Multivitamins CAPS; Therapy: (Recorded:28Nov2016) to Recorded   7  Orphenadrine Citrate  MG Oral Tablet Extended Release 12 Hour; TAKE 1   TABLET TWICE DAILY AS NEEDED; Therapy: 91PKX1533 to (Evaluate:76Fqn5200)  Requested for: 52ZTY3428; Last   Rx:28Nov2016 Ordered   8  Oxybutynin Chloride ER 10 MG Oral Tablet Extended Release 24 Hour; Take 1 tablet   daily; Therapy: 22UNN6543 to (Evaluate:10Qxa6939)  Requested for: 76NUL5370; Last   Rx:23Epa0895 Ordered   9  Oxycodone-Acetaminophen 5-325 MG Oral Tablet; TAKE 1 TABLET EVERY 4 TO 6   HOURS AS NEEDED FOR PAIN;   Therapy: 35Iga2250 to (Complete:34Mpw7260); Last Rx:29Eky7564 Ordered   10  Reclast 5 MG/100ML Intravenous Solution; Therapy: (Recorded:08Lcr3008) to Recorded   11  Saline Nasal Spray 0 65 % Nasal Solution; 2 spray each nostril twice a day; Therapy: 49QCT5597 to (Last Rx:21Nov2015) Ordered   12  Vitamin D3 1000 UNIT Oral Tablet; Therapy: (Recorded:28Nov2016) to Recorded    Allergies    1  Aspirin TABS   2  Penicillins    3  No Known Environmental Allergies   4  No Known Food Allergies    Future Appointments    Date/Time Provider Specialty Site   02/23/2017 10:15 AM [facility]EP Duff  [facility]   03/09/2017 09:45 AM PARDEEP Duff   [facility]     Signatures   Electronically signed by : Clifford Elizabeth, ; Feb 21 2017 11:03AM EST                       (Author)

## 2018-01-11 NOTE — RESULT NOTES
Dear Dav Grady,   Your test results have returned and are listed below:      Discussion/Summary  Your results have some minor abnormalities, but nothing that is concerning  Please keep your regularly scheduled follow-up appointment  If you do not have a follow-up scheduled, please call the office to schedule a follow-up visit  If you have any questions, please don't hesitate to call the office  Sincerely,      Signatures   Electronically signed by : VALENTINO Sauer RD;  Dec  6 2016 11:18AM EST                       (Author)    Electronically signed by : PARDEEP Pastrana ; Dec  8 2016  3:44PM EST                       (Validation)

## 2018-01-12 VITALS
TEMPERATURE: 98.3 F | BODY MASS INDEX: 34.01 KG/M2 | HEIGHT: 62 IN | DIASTOLIC BLOOD PRESSURE: 72 MMHG | RESPIRATION RATE: 16 BRPM | SYSTOLIC BLOOD PRESSURE: 104 MMHG | HEART RATE: 76 BPM | WEIGHT: 184.8 LBS

## 2018-01-12 VITALS
WEIGHT: 189.5 LBS | DIASTOLIC BLOOD PRESSURE: 80 MMHG | SYSTOLIC BLOOD PRESSURE: 122 MMHG | RESPIRATION RATE: 22 BRPM | BODY MASS INDEX: 34.87 KG/M2 | TEMPERATURE: 98 F | HEIGHT: 62 IN | HEART RATE: 80 BPM

## 2018-01-12 NOTE — PROGRESS NOTES
Assessment    1  Morbid obesity (278 01) (E66 01)    Discussion/Summary    Doing well post op  No major issues  The low molecular weight heparin prophylactic treatment will be completed in a couple days  The pathology report was reviewed with the patient and the results were within normal limits  Increase physical activity as tolerated and as instructed  Advance diet as indicated in the binder  Follow up in one month as scheduled  Chief Complaint  Patient in office today for 1st post/sleeve  She is tolerating her diet  She has a follow up appointment with urology due to issues emptying bladder  Post-Op  Post-Op Bariatric Surgery:   Yolette Richmond is status post lap sleeve procedure, performed on 2/28/2017  HPI: today's weight is 209 pounds and herpre-op weight was 224 pounds  The patient reports a loss of appetite, but not experiencing hunger, no nausea, no vomiting, no dyspnea, no fever, no constipation, no diarrhea, not experiencing dumping syndrome, no chest pain, no abdominal pain, no excessive pain, no swelling and no abnormal bleeding  Diet and Exercise: Diet history reviewed and discussed with the patient  Weight loss/gains to date discussed with the patient  She reports no carbonated beverage consumption and no regular exercise  Supplements: multivitamins, B-12, calcium and iron  PE:   Abdominal exam: soft, nontender, normal bowel sounds, no rebound tenderness, no guarding, no incisional hernia and no palpable mass  The surgical incision site is clean, dry and intact, not warm, not indurated, not erythematous, not ecchymotic, not swollen and not tender  Assessment:   Post-op, the patient is doing well  Plan: Activity restrictions: None     Instructions / Recommendations: dietary counseling recommended, recommended a daily protein intake of  grams, vitamin supplement(s) recommended, mineral supplement(s) recommended, recommended exercising at least 150 minutes per week, behavior modifications recommended, participating in a multi-disciplinary approach with her primary care provider and endocrinology or gastroenterology recommended and instructed to call the office for concerns, questions, or problems  The patient was instructed to follow up in 4 weeks  Active Problems    1  Allergic rhinitis (477 9) (J30 9)   2  Annual physical exam (V70 0) (Z00 00)   3  Blood tests prior to treatment or procedure (V72 63) (Z01 812)   4  Closed Infrasyndesmotic Fracture Of The Left Lateral Malleolus With Posterior Medial   Fracture (Crispin-Mathew A3) (824 6)   5  Colon cancer screening (V76 51) (Z12 11)   6  Edema (782 3) (R60 9)   7  Elevated BP without diagnosis of hypertension (796 2) (R03 0)   8  Encounter for routine gynecological examination (V72 31) (Z01 419)   9  Encounter for screening colonoscopy (V76 51) (Z12 11)   10  Encounter for screening mammogram for malignant neoplasm of breast (V76 12)    (Z12 31)   11  Generalized osteoarthritis of multiple sites (715 09) (M15 9)   12  Hyperlipidemia (272 4) (E78 5)   13  Morbid obesity (278 01) (E66 01)   14  Need for prophylactic vaccination and inoculation against influenza (V04 81) (Z23)   15  Osteoarthritis of knee (715 36) (M17 9)   16  Osteoporosis (733 00) (M81 0)   17  Other urinary incontinence (788 39) (N39 498)   18  Pes planus, unspecified laterality (734) (M21 40)   19  Preop cardiovascular exam (V72 81) (Z01 810)   20  Rosacea (695 3) (L71 9)   21  Sciatic pain, left (724 3) (M54 32)   22  Screening for osteoporosis (V82 81) (Z13 820)   23  Screening for tuberculosis (V74 1) (Z11 1)   24  Strain of thoracic region, initial encounter (847 1) (S29 019A)   25  Strain, back (847 9) (S39 012A)   26  Urinary tract infection (599 0) (N39 0)   27   Vitamin D deficiency (268 9) (E55 9)    Social History    · Alcohol Use (History)   · minimal to none   · Caffeine use (V49 89) (F15 90)   · 2-3 cups iced tea   · Former smoker (V15 82) (W49 347)   · No drug use   · Denied: History of Uses Safety Equipment   · smoke detectors   · Uses Safety Equipment - Seatbelts    Current Meds   1  Allegra Allergy 180 MG Oral Tablet; TAKE 1 TABLET DAILY AS NEEDED FOR ALLERGIES; Therapy: 10OJN9479 to Recorded   2  Bariatric Fusion Oral Tablet Chewable; Therapy: (Recorded:09Mar2017) to Recorded   3  Biotin 5000 MCG Oral Capsule; Therapy: (Recorded:28Nov2016) to Recorded   4  Calcium CHEW;   Therapy: (Recorded:09Mar2017) to Recorded   5  Enoxaparin Sodium 40 MG/0 4ML Subcutaneous Solution; inject once daily for 2 weeks   post surgery; Therapy: 88JYU0217 to (Last Rx:58Qnb1558)  Requested for: 72Lft5454 Ordered   6  Omeprazole 20 MG Oral Capsule Delayed Release; take 1 capsule daily; Therapy: 65UBZ6682 to (Evaluate:23Jun2017)  Requested for: 23Wvz7718; Last   Rx:23Feb2017 Ordered   7  Reclast 5 MG/100ML Intravenous Solution; Therapy: (Recorded:15Sep2016) to Recorded   8  Saline Nasal Spray 0 65 % Nasal Solution; 2 spray each nostril twice a day; Therapy: 41YTP2069 to (Last Rx:21Nov2015) Ordered   9  Vitamin D3 1000 UNIT Oral Tablet; Therapy: (Recorded:28Nov2016) to Recorded    Allergies    1  Aspirin TABS   2  Penicillins    3  No Known Environmental Allergies   4  No Known Food Allergies    Vitals   Recorded: 33WNI2824 09:46AM   Temperature 98 F   Heart Rate 76   Respiration 22   Systolic 240   Diastolic 70   Height 5 ft 2 in   Weight 209 lb 8 0 oz   BMI Calculated 38 32   BSA Calculated 1 95     Future Appointments    Date/Time Provider Specialty Site   03/13/2017 08:45 AM Sharry Goldberg, M D   [facility]     Signatures   Electronically signed by : PARDEEP Correa ; Mar  9 2017 10:04AM EST                       (Author)

## 2018-01-13 VITALS — BODY MASS INDEX: 41.04 KG/M2 | WEIGHT: 223 LBS | HEIGHT: 62 IN

## 2018-01-13 VITALS
BODY MASS INDEX: 38.55 KG/M2 | HEART RATE: 76 BPM | SYSTOLIC BLOOD PRESSURE: 110 MMHG | TEMPERATURE: 98 F | DIASTOLIC BLOOD PRESSURE: 70 MMHG | WEIGHT: 209.5 LBS | HEIGHT: 62 IN | RESPIRATION RATE: 22 BRPM

## 2018-01-13 NOTE — PROGRESS NOTES
Discussion/Summary  Discussion Summary:   Patient attended 5 week post op class  Reviewed diet progression, vitamin and mineral recommendations, 30/60 rules, volume of foods, protein supplements, hydration needs, antacid guidelines, recommendation to f/u with pcp concerning med adjustments, exercise guidelines, hair shedding, contraception guidelines, constipation prevention and treatment, alcohol avoidance and recommendations of when to call the office  Patient was also weighed and filled out form for program   Time was left for discussion and to answer questions  Active Problems    1  Allergic rhinitis (477 9) (J30 9)   2  Annual physical exam (V70 0) (Z00 00)   3  Blood tests prior to treatment or procedure (V72 63) (Z01 812)   4  Closed Infrasyndesmotic Fracture Of The Left Lateral Malleolus With Posterior Medial   Fracture (Crispin-Mathew A3) (824 6)   5  Colon cancer screening (V76 51) (Z12 11)   6  Edema (782 3) (R60 9)   7  Elevated BP without diagnosis of hypertension (796 2) (R03 0)   8  Encounter for routine gynecological examination (V72 31) (Z01 419)   9  Encounter for screening colonoscopy (V76 51) (Z12 11)   10  Encounter for screening mammogram for malignant neoplasm of breast (V76 12)    (Z12 31)   11  Generalized osteoarthritis of multiple sites (715 09) (M15 9)   12  Hyperlipidemia (272 4) (E78 5)   13  Morbid obesity (278 01) (E66 01)   14  Need for prophylactic vaccination and inoculation against influenza (V04 81) (Z23)   15  Osteoarthritis of knee (715 36) (M17 10)   16  Osteoporosis (733 00) (M81 0)   17  Other urinary incontinence (788 39) (N39 498)   18  Pes planus, unspecified laterality (734) (M21 40)   19  Preop cardiovascular exam (V72 81) (Z01 810)   20  Rosacea (695 3) (L71 9)   21  Sciatic pain, left (724 3) (M54 32)   22  Screening for osteoporosis (V82 81) (Z13 820)   23  Screening for tuberculosis (V74 1) (Z11 1)   24   Status post laparoscopic sleeve gastrectomy (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 86) (Z98 84)   25  Strain of thoracic region, initial encounter (847 1) (S29 019A)   26  Strain, back (847 9) (S39 012A)   27  Urinary tract infection (599 0) (N39 0)   28  Vitamin D deficiency (268 9) (E55 9)    Past Medical History    1  History of Acute bronchitis (466 0) (J20 9)   2  Acute sinusitis (461 9) (J01 90)   3  History of Acute upper respiratory infection (465 9) (J06 9)   4  History of Chronic Primary Lymphadenitis (289 1)   5  History of acute pharyngitis (V12 69) (Z87 09)   6  History of acute sinusitis (V12 69) (Z87 09)   7  History of osteoporosis (V13 59) (Z87 39)   8  History of Hordeolum externum, unspecified laterality (373 11) (H00 019)   9  Need for prophylactic vaccination and inoculation against influenza (V04 81) (Z23)   10  History of Synovial Cyst (727 40)   11  History of Unspecified Muscle Strain (848 9)   12  History of Urinary Tract Infection (V13 02)    Surgical History    1  History of Foot Surgery   2  History of Gastrectomy Sleeve Laparoscopic   3  History of Knee Surgery   4  History of Neuroplasty Decompression Median Nerve At Carpal Tunnel   5  History of Rotator Cuff Repair   6  History of Salpingo-oophorectomy Bilateral Removal Of Solitary Ovary And Tube    Family History  Mother    1  Family history of Mother  At Age 76  Father    2  Family history of Father  At Age 60   1  Family history of Heart Disease (V17 49)  Family History    4  Family history of Cancer   5  Family history of Endometriosis   6  Family history of Osteoporosis (V17 81)    Social History    · Alcohol Use (History)   · Caffeine use (V49 89) (F15 90)   · Former smoker (V15 82) (Z87 891)   · No drug use   · Denied: History of Uses Safety Equipment   · Uses Safety Equipment - Seatbelts    Current Meds   1  Allegra Allergy 180 MG Oral Tablet; TAKE 1 TABLET DAILY AS NEEDED FOR ALLERGIES; Therapy: 66XSX8761 to Recorded   2  Bariatric Fusion Oral Tablet Chewable;    Therapy: (Recorded:09Mar2017) to Recorded   3  Biotin 5000 MCG Oral Capsule; Therapy: (Recorded:28Nov2016) to Recorded   4  Calcium CHEW;   Therapy: (Recorded:09Mar2017) to Recorded   5  Enoxaparin Sodium 40 MG/0 4ML Subcutaneous Solution; inject once daily for 2 weeks   post surgery; Therapy: 26UZX5460 to (Last Rx:16Uvr4047)  Requested for: 11Ssi6885 Ordered   6  Omeprazole 20 MG Oral Capsule Delayed Release; take 1 capsule daily; Therapy: 63EIR8288 to (Evaluate:23Jun2017)  Requested for: 23Feb2017; Last   Rx:17Nwe7930 Ordered   7  Reclast 5 MG/100ML Intravenous Solution; Therapy: (Recorded:15Sep2016) to Recorded   8  Saline Nasal Spray 0 65 % Nasal Solution; 2 spray each nostril twice a day; Therapy: 05SNV9224 to (Last Rx:21Nov2015) Ordered   9  Vitamin D3 1000 UNIT Oral Tablet; Therapy: (Recorded:28Nov2016) to Recorded    Allergies    1  Aspirin TABS   2  Penicillins    3  No Known Environmental Allergies   4   No Known Food Allergies    Vitals  Signs   Recorded: 11Apr2017 10:02AM   Height: 5 ft 2 in  Weight: 202 lb 12 8 oz  BMI Calculated: 37 09  BSA Calculated: 1 92    Future Appointments    Date/Time Provider Specialty Site   06/13/2017 09:30 AM Erika Karimi, [facility]     Signatures   Electronically signed by : VALENTINO Leo RD; Apr 11 2017 10:02AM EST                       (Author)    Electronically signed by : PARDEEP Wheatley ; Apr 13 2017 12:16PM EST                       (Validation)

## 2018-01-14 VITALS
RESPIRATION RATE: 13 BRPM | TEMPERATURE: 97.3 F | HEART RATE: 62 BPM | BODY MASS INDEX: 33.49 KG/M2 | DIASTOLIC BLOOD PRESSURE: 72 MMHG | HEIGHT: 62 IN | WEIGHT: 182 LBS | SYSTOLIC BLOOD PRESSURE: 116 MMHG

## 2018-01-14 VITALS — WEIGHT: 202.8 LBS | BODY MASS INDEX: 37.32 KG/M2 | HEIGHT: 62 IN

## 2018-01-15 VITALS
BODY MASS INDEX: 40.48 KG/M2 | DIASTOLIC BLOOD PRESSURE: 70 MMHG | WEIGHT: 220 LBS | HEART RATE: 72 BPM | SYSTOLIC BLOOD PRESSURE: 124 MMHG | HEIGHT: 62 IN | RESPIRATION RATE: 22 BRPM | TEMPERATURE: 97.2 F

## 2018-01-15 NOTE — MISCELLANEOUS
Chief Complaint  Chief Complaint Free Text Note Form: 03/02/2017 LM on VM for patient to call back and schedule a TORREY    03/03/2017 LM on VM for patient to call back and schedule a TORREY   03/06/2017 Patient never called back to schedule no TORREY code posted in [employer]  History of Present Illness  TCM Communication [facility]: The patient is being contacted for follow-up after hospitalization  Hospital records were reviewed  She was hospitalized at [facility]  The date of admission: 02/28/2017, date of discharge: 03/02/2017  Diagnosis: 1  Morbid Obesity due to excess calories E66 01 2  Hyperlididemia  She was discharged to home  Medications were not reviewed today  She did not schedule a follow up appointment  She refused a follow up appointment due to Patient never called back to [facility]  Follow-up appointments with other specialists: Surgeon  Unknown   Communication performed and completed by ADAN Richard MA      Active Problems    1  Allergic rhinitis (477 9) (J30 9)   2  Annual physical exam (V70 0) (Z00 00)   3  Blood tests prior to treatment or procedure (V72 63) (Z01 812)   4  Closed Infrasyndesmotic Fracture Of The Left Lateral Malleolus With Posterior Medial   Fracture (Crispin-Mathew A3) (824 6)   5  Colon cancer screening (V76 51) (Z12 11)   6  Edema (782 3) (R60 9)   7  Elevated BP without diagnosis of hypertension (796 2) (R03 0)   8  Encounter for routine gynecological examination (V72 31) (Z01 419)   9  Encounter for screening colonoscopy (V76 51) (Z12 11)   10  Encounter for screening mammogram for malignant neoplasm of breast (V76 12)    (Z12 31)   11  Generalized osteoarthritis of multiple sites (715 09) (M15 9)   12  Hyperlipidemia (272 4) (E78 5)   13  Morbid obesity (278 01) (E66 01)   14  Need for prophylactic vaccination and inoculation against influenza (V04 81) (Z23)   15  Osteoarthritis of knee (715 36) (M17 9)   16  Osteoporosis (733 00) (M81 0)   17   Other urinary incontinence (788 39) (N39 498) 18  Pes planus, unspecified laterality (734) (M21 40)   19  Preop cardiovascular exam (V72 81) (Z01 810)   20  Rosacea (695 3) (L71 9)   21  Sciatic pain, left (724 3) (M54 32)   22  Screening for osteoporosis (V82 81) (Z13 820)   23  Screening for tuberculosis (V74 1) (Z11 1)   24  Strain of thoracic region, initial encounter (847 1) (S29 019A)   25  Strain, back (847 9) (S39 012A)   26  Urinary tract infection (599 0) (N39 0)   27  Vitamin D deficiency (268 9) (E55 9)    Past Medical History    1  History of Acute bronchitis (466 0) (J20 9)   2  Acute sinusitis (461 9) (J01 90)   3  History of Acute upper respiratory infection (465 9) (J06 9)   4  History of Chronic Primary Lymphadenitis (289 1)   5  History of acute pharyngitis (V12 69) (Z87 09)   6  History of acute sinusitis (V12 69) (Z87 09)   7  History of osteoporosis (V13 59) (Z87 39)   8  History of Hordeolum externum, unspecified laterality (373 11) (H00 019)   9  Need for prophylactic vaccination and inoculation against influenza (V04 81) (Z23)   10  History of Synovial Cyst (727 40)   11  History of Unspecified Muscle Strain (848 9)   12  History of Urinary Tract Infection (V13 02)    Surgical History    1  History of Foot Surgery   2  History of Knee Surgery   3  History of Neuroplasty Decompression Median Nerve At Carpal Tunnel   4  History of Rotator Cuff Repair   5  History of Salpingo-oophorectomy Bilateral Removal Of Solitary Ovary And Tube    Family History  Mother    1  Family history of Mother  At Age 76  Father    2  Family history of Father  At Age 60   1  Family history of Heart Disease (V17 49)  Family History    4  Family history of Cancer   5  Family history of Endometriosis   6   Family history of Osteoporosis (V17 81)    Social History    · Alcohol Use (History)   · Caffeine use (V49 89) (F15 90)   · Former smoker (V15 82) (Z87 891)   · No drug use   · Denied: History of Uses Safety Equipment   · Uses Safety Equipment - Seatbelts    Current Meds   1  Allegra Allergy 180 MG Oral Tablet; TAKE 1 TABLET DAILY AS NEEDED FOR ALLERGIES; Therapy: 43OSN9068 to Recorded   2  Biotin 5000 MCG Oral Capsule; Therapy: (Recorded:28Nov2016) to Recorded   3  Caltrate 600+D TABS; Therapy: (Recorded:28Nov2016) to Recorded   4  Cranberry TABS; Therapy: (Recorded:28Nov2016) to Recorded   5  Enoxaparin Sodium 40 MG/0 4ML Subcutaneous Solution; inject once daily for 2 weeks   post surgery; Therapy: 00IWC8826 to (Last Rx:24Feb2017)  Requested for: 24Feb2017 Ordered   6  HydroCHLOROthiazide 12 5 MG Oral Capsule; TAKE ONE CAPSULE BY MOUTH EVERY   DAY; Therapy: 08SKB6033 to (Evaluate:11Aug2017)  Requested for: 84KRW4060; Last   Rx:13Jan2017 Ordered   7  Multivitamins CAPS; Therapy: (Recorded:28Nov2016) to Recorded   8  Omeprazole 20 MG Oral Capsule Delayed Release; take 1 capsule daily; Therapy: 35LEN7772 to (Evaluate:23Jun2017)  Requested for: 58EFB5909; Last   Rx:23Feb2017 Ordered   9  Orphenadrine Citrate  MG Oral Tablet Extended Release 12 Hour; TAKE 1   TABLET TWICE DAILY AS NEEDED; Therapy: 06PCK4361 to (Evaluate:24Ydg0461)  Requested for: 92HFC5743; Last   Rx:28Nov2016 Ordered   10  Oxybutynin Chloride ER 10 MG Oral Tablet Extended Release 24 Hour; Take 1 tablet    daily; Therapy: 47BYB6002 to (Evaluate:21May2017)  Requested for: 00DNC4095; Last    Rx:26May2016 Ordered   11  Reclast 5 MG/100ML Intravenous Solution; Therapy: (Recorded:97Ace0024) to Recorded   12  Saline Nasal Spray 0 65 % Nasal Solution; 2 spray each nostril twice a day; Therapy: 01JAL2684 to (Last Rx:21Nov2015) Ordered   13  Vitamin D3 1000 UNIT Oral Tablet; Therapy: (Recorded:28Nov2016) to Recorded    Allergies    1  Aspirin TABS   2  Penicillins    3  No Known Environmental Allergies   4  No Known Food Allergies    Health Management  Colon cancer screening   COLONOSCOPY; every 3 years; Last 12Apr2016; Next Due: 12Apr2019;  Active  Encounter for routine gynecological examination   (every) THINPREP PAP; every 2 years; Last 34BVQ5438; Next Due: 01Jun2013; Overdue    Future Appointments    Date/Time Provider Specialty Site   03/09/2017 09:45 AM PARDEEP Adams  General Surgery [facility] WEIGHT MANAGEMENT [place]   03/13/2017 08:45 AM PARDEEP Donnelly   Urology [address]   Electronically signed by : Dylan Lira, ; Mar  6 2017  8:19AM EST                       (Author)    Electronically signed by : Lion Serrano; Mar  6 2017  9:42AM EST                       (Author)    Electronically signed by : Yissel Correia DO; Mar  6 2017  8:48PM EST

## 2018-01-15 NOTE — PROGRESS NOTES
History of Present Illness  Bariatric MNT [facility] Surgery Screening Preop [facility]:     She was on time  the appointment lasted: 45 minutes  Her surgeon is Dr Melchor Quiroga  The patient was present at the session  The diagnosis/reason for the appointment is: She has Grade III Obesity with a BMI of 41  She has the following comorbidities: hypertension and osteoporosis   Labs: Lanny Harris She was reminded to have her labs drawn   She does not need to monitor her glucose  Exercise Frequency:  She does not exercise  Relationship to food: She eats when she is bored and grazes  She knows the difference between being comfortably full and uncomfortably full and knows the difference between being stuffed and comfortably full  She felt/thought they felt her best at 150 pounds she last weighed this about 40 years ago  She did not complete a food journal 24-Hour Food Recall Breakfast 9 am Hard boiled egg OR  [place] thins with butter  Lunch: 11 to 1 pm  turkey and cheese on a sandwich thin OR  chicken or tuna salad on [place] lettuce ( wrapped )  Dinner: 5 to 6 pm  Lean protein and starch  sometimes a vegetable  Snacks: evening snack pretzels  sometimes cheese  She drinks 8 cups of water daily She drinks 0-4 caffienated beverages daily Her motivators are:wants less hip and foot pain  Care for her  who has parkinson's   Her obesity/being overweight is related to sedentary lifestyle and excess energy intake  and is evidenced by: BMI 41  Barriers to education:  She has no barriers to education  Medical Nutrition Therapy Intervention: Daily Food /Exercise Diary, Lifestyle /Behavior Modification Techniques, Basic Pathophysiology of Obesity, Checklist of the Overview of Lesson Plans and Surgical changes to Stomach/GI  Area's Reviewed: Post - surgery goal weight, Lifestyle Malini Mock Modification Techniques, [employer] in [place] and Pre- surgery goals /rules     Brief Review of Vitamins, diet progression for post-op and Pathophysiology of Obesity  Her comprehension of the presented material was good  Her receptivity to the presented material was good  Her motivation was good  Provided: Nutrition Guidelines for Gastric Surgery, Bariatric Program Pre- Surgery Nutrition and Goals  Goals: Her set goal for physical activity is stationary bike , for 20 minutes, 4-5 days a week  Review [employer] [place]   Post Surgery: She will adhere to the diet progression: remain hydrated, consume adequate protein; and take vitamins as outlined in guidelines  Patient is a 64year old who is here for nutritional evaluation for weight loss surgery  I reviewed comorbidities and medications prior to session  Patient is currently being treated for osteoporosis and recently suffered a severe fracture of her ankle/lower leg   She completed her PT but still walks with a limp and no longer has a foot arch  She first recalls having problems with weight gain as an adult   She has dieted in the past with variable success but would regain the weight back  (refer to diet history for details)  For her personal pre-operative goals she will: eat breakfast daily, and practice chewing her food  She will complete all 6 lesson plans in her bariatric booklet  She was instructed on the importance of consistent vitamin and mineral intake after her surgery to prevent deficiencies  She currently takes a vitamin D3, calcium, multivitamin and mineral  Recommended that she continue to take her current vitamin regimen  Reviewed importance of support after surgery and discussed the web forum, bariapp, pep rally and support group  Patient states adequate knowledge of nutrition, exercise and behavior modification required for long term success  Pt  is recommended for surgery and is aware that she will be required to follow the 2 week pre operative liver shrinking diet prior to surgery   Pt also understands that she needs to implement lifestyle changes in preparation for surgery  Patient is aware that she has 6 months of weigh ins required by her insurance  Recommendations: She was provided contact information for any questions  She is recommended for surgery  She states adequate knowledge of nutrition, exercise, and behavior modification  She will attend a Team Meeting approximately 2-3 weeks prior to surgery  Active Problems    1  Allergic rhinitis (477 9) (J30 9)   2  Annual physical exam (V70 0) (Z00 00)   3  Closed Infrasyndesmotic Fracture Of The Left Lateral Malleolus With Posterior Medial   Fracture (Crispin-Mathew A3) (824 6)   4  Colon cancer screening (V76 51) (Z12 11)   5  Edema (782 3) (R60 9)   6  Elevated blood pressure reading without diagnosis of hypertension (796 2) (R03 0)   7  Encounter for routine gynecological examination (V72 31) (Z01 419)   8  Encounter for screening colonoscopy (V76 51) (Z12 11)   9  Encounter for screening mammogram for malignant neoplasm of breast (V76 12)   (Z12 31)   10  Generalized osteoarthritis of multiple sites (715 09) (M15 9)   11  Hyperlipidemia (272 4) (E78 5)   12  Morbid obesity (278 01) (E66 01)   13  Need for prophylactic vaccination and inoculation against influenza (V04 81) (Z23)   14  Osteoarthritis of knee (715 36) (M17 9)   15  Osteoporosis (733 00) (M81 0)   16  Other urinary incontinence (788 39) (N39 498)   17  Pes planus, unspecified laterality (734) (M21 40)   18  Rosacea (695 3) (L71 9)   19  Sciatic pain, left (724 3) (M54 32)   20  Screening for osteoporosis (V82 81) (Z13 820)   21  Screening for tuberculosis (V74 1) (Z11 1)   22  Strain of thoracic region, initial encounter (847 1) (S29 019A)   23  Urinary tract infection (599 0) (N39 0)   24  Vitamin D deficiency (268 9) (E55 9)    Past Medical History    1  History of Acute bronchitis (466 0) (J20 9)   2  Acute sinusitis (461 9) (J01 90)   3  History of Acute upper respiratory infection (465 9) (J06 9)   4   History of Chronic Primary Lymphadenitis (289 1)   5  History of acute pharyngitis (V12 69) (Z87 09)   6  History of acute sinusitis (V12 69) (Z87 09)   7  History of osteoporosis (V13 59) (Z87 39)   8  History of Hordeolum externum, unspecified laterality (373 11) (H00 019)   9  Need for prophylactic vaccination and inoculation against influenza (V04 81) (Z23)   10  History of Synovial Cyst (727 40)   11  History of Unspecified Muscle Strain (848 9)   12  History of Urinary Tract Infection (V13 02)    Surgical History    1  History of Foot Surgery   2  History of Knee Surgery   3  History of Neuroplasty Decompression Median Nerve At Carpal Tunnel   4  History of Rotator Cuff Repair   5  History of Salpingo-oophorectomy Bilateral Removal Of Solitary Ovary And Tube    Family History  Mother    1  Family history of Mother  At Age 76  Father    2  Family history of Father  At Age 60   1  Family history of Heart Disease (V17 49)  Family History    4  Family history of Cancer   5  Family history of Endometriosis   6  Family history of Osteoporosis (V17 81)    Social History    · Alcohol Use (History)   · Caffeine use (V49 89) (F15 90)   · Former smoker (V15 82) (Z87 891)   · No drug use   · Denied: History of Uses Safety Equipment   · Uses Safety Equipment - Seatbelts    Current Meds   1  Biotin 5000 MCG Oral Capsule; Therapy: (Recorded:44Uaf2746) to Recorded   2  Caltrate 600+D TABS; Therapy: (Recorded:57Zxq6364) to Recorded   3  Cranberry TABS; Therapy: (Recorded:52Qsm8519) to Recorded   4  Fluticasone Propionate 50 MCG/ACT Nasal Suspension; USE 2 SPRAYS IN EACH   NOSTRIL ONCE DAILY  Requested for: 2015; Last Rx:2015 Ordered   5  Hydrochlorothiazide 12 5 MG Oral Capsule; TAKE 1 CAPSULE DAILY; Therapy: 33MWT1509 to (Evaluate:2017)  Requested for: 02WPN9704; Last   Rx:2016 Ordered   6  Multivitamins CAPS; Therapy: (Recorded:44Pjj8198) to Recorded   7   Oxybutynin Chloride ER 10 MG Oral Tablet Extended Release 24 Hour; Take 1 tablet   daily; Therapy: 68NWW9921 to (Evaluate:74Nop4902)  Requested for: 79KML7520; Last   Rx:56Rrk2311 Ordered   8  Reclast 5 MG/100ML Intravenous Solution (Zoledronic Acid); Therapy: (Recorded:22Dxo1987) to Recorded   9  Saline Nasal Spray 0 65 % Nasal Solution; 2 spray each nostril twice a day; Therapy: 77TJD1546 to (Last Rx:21Nov2015) Ordered   10  Vitamin D3 1000 UNIT Oral Tablet; Therapy: (Recorded:46Twk3762) to Recorded   11  Zyrtec 10 MG TABS; TAKE 1 TABLET AT BEDTIME; Therapy: (Recorded:25Dax7243) to Recorded    Allergies    1  Aspirin TABS   2  Penicillins    3  No Known Environmental Allergies   4  No Known Food Allergies    Future Appointments    Date/Time Provider Specialty Site   10/21/2016 10:15 AM PARDEEP Andrew Asa  General Surgery [facility] WEIGHT MANAGEMENT [place]   11/18/2016 08:30 AM [facility]EP Gar[facility] [place]     Signatures   Electronically signed by : CRUZITO Willson; Sep 20 2016  2:42PM EST                       (Author)    Electronically signed by : PARDEEP Curiel ; Sep 22 2016  1:04PM EST                       (Validation)

## 2018-01-15 NOTE — RESULT NOTES
Verified Results  (1) TISSUE EXAM 12Apr2016 09:26AM Boris Aponte   Cecal polyp hot snare     Test Name Result Flag Reference   LAB AP CASE REPORT (Report)     Surgical Pathology Report             Case: [id number]                   Authorizing Provider: Chidi Roche MD      Collected:      04/12/2016 0926        Ordering Location:   [facility]    Received:      04/13/2016 Via [facility]  Endoscopy                            Pathologist:      Ellie Castro MD                                 Specimens:  A) - Polyp, Colorectal, cecal polyp hot snare                             B) - Large Intestine, Sigmoid Colon, sigmoid polyp hot snare clip applied   LAB AP FINAL DIAGNOSIS      A  Cecal polyp (polypectomy):  - Fragments of tubulovillous adenoma  - No high grade dysplasia     B  Sigmoid polyp (polypectomy):   - Tubulovillous adenoma  - No high grade dysplasia   LAB AP SURGICAL ADDITIONAL INFORMATION (Report)     These tests were developed and their performance characteristics   determined by [address] [place] [address] Specialty Laboratory or [facility]  They may not be cleared or approved by the [place]  Food and   Drug Administration  The FDA has determined that such clearance or   approval is not necessary  These tests are used for clinical purposes  They should not be regarded as investigational or for research  This   laboratory has been approved by [place] [address], designated as a high-complexity   laboratory and is qualified to perform these tests  LAB AP GROSS DESCRIPTION (Report)     A  The specimen is received in formalin, labeled with the patient's name   and medical record number, and is designated cecal polyp  The specimen   consists of 4 tan yellow soft tissue fragments each measuring 0 5-0 6 cm  Entirely submitted  One cassette  B  The specimen is received in formalin, labeled with the patient's name   and medical record number, and is designated sigmoid polyp   The specimen consists of a single tan polypoid soft tissue fragment measuring   1 2 cm  No margin is identified  The polyp is bisected lengthwise  Entirely submitted  One cassette  Note: The estimated total formalin fixation time based upon information   provided by the submitting clinician and the standard processing schedule   is 35 0 hours  MAC   LAB AP CLINICAL INFORMATION (Report)     Cecal polyp hot snare    Colonoscopy: A single sessile polyp, measuring 6 mm in size, was found in   the cecum  The polyp was removed by snare cautery polypectomy  A single   pedunculated polyp, measuring 12 mm in size, was found in the sigmoid   colon  The polyp was removed by snare cautery polypectomy

## 2018-01-15 NOTE — MISCELLANEOUS
Message  3/3/2017 @ 1230- post op follow up phone call completed  Pt is sipping liquids, using IS as instructed, reinforced importance of using IS to help prevent pneumonia  Ambulating about home without difficulty  Pain controlled with analgesia  Tolerating self administration of lovenox injections without difficulty  Pt went home with indwelling rivera  Rivera draining clear yellow urine  Pt has call into urologist for follow up appt  Reaffirmed examples of liquid diet over the next week  Pt stated understanding about discharge instructions and medication adjustments  Pt educated on [address]   Follow up appt with surgeon scheduled for next week  Instructed to call with any additional questions or concerns  Active Problems    1  Allergic rhinitis (477 9) (J30 9)   2  Annual physical exam (V70 0) (Z00 00)   3  Blood tests prior to treatment or procedure (V72 63) (Z01 812)   4  Closed Infrasyndesmotic Fracture Of The Left Lateral Malleolus With Posterior Medial   Fracture (Crispin-Mathew A3) (824 6)   5  Colon cancer screening (V76 51) (Z12 11)   6  Edema (782 3) (R60 9)   7  Elevated BP without diagnosis of hypertension (796 2) (R03 0)   8  Encounter for routine gynecological examination (V72 31) (Z01 419)   9  Encounter for screening colonoscopy (V76 51) (Z12 11)   10  Encounter for screening mammogram for malignant neoplasm of breast (V76 12)    (Z12 31)   11  Generalized osteoarthritis of multiple sites (715 09) (M15 9)   12  Hyperlipidemia (272 4) (E78 5)   13  Morbid obesity (278 01) (E66 01)   14  Need for prophylactic vaccination and inoculation against influenza (V04 81) (Z23)   15  Osteoarthritis of knee (715 36) (M17 9)   16  Osteoporosis (733 00) (M81 0)   17  Other urinary incontinence (788 39) (N39 498)   18  Pes planus, unspecified laterality (734) (M21 40)   19  Preop cardiovascular exam (V72 81) (Z01 810)   20  Rosacea (695 3) (L71 9)   21  Sciatic pain, left (724 3) (M54 32)   22  Screening for osteoporosis (V82 81) (Z13 820)   23  Screening for tuberculosis (V74 1) (Z11 1)   24  Strain of thoracic region, initial encounter (847 1) (S29 019A)   25  Strain, back (847 9) (S39 012A)   26  Urinary tract infection (599 0) (N39 0)   27  Vitamin D deficiency (268 9) (E55 9)    Current Meds   1  Allegra Allergy 180 MG Oral Tablet (Fexofenadine HCl); TAKE 1 TABLET DAILY AS   NEEDED FOR ALLERGIES; Therapy: 58TWW3384 to Recorded   2  Biotin 5000 MCG Oral Capsule; Therapy: (Recorded:28Nov2016) to Recorded   3  Caltrate 600+D TABS; Therapy: (Recorded:28Nov2016) to Recorded   4  Cranberry TABS; Therapy: (Recorded:28Nov2016) to Recorded   5  Enoxaparin Sodium 40 MG/0 4ML Subcutaneous Solution (Lovenox); inject once daily for   2 weeks post surgery; Therapy: 42VDN9516 to (Last Rx:24Feb2017)  Requested for: 86Fha9970 Ordered   6  HydroCHLOROthiazide 12 5 MG Oral Capsule; TAKE ONE CAPSULE BY MOUTH   EVERY DAY; Therapy: 07LGS3837 to (Evaluate:11Aug2017)  Requested for: 91VRU8592; Last   Rx:13Jan2017 Ordered   7  Multivitamins CAPS; Therapy: (Recorded:28Nov2016) to Recorded   8  Omeprazole 20 MG Oral Capsule Delayed Release; take 1 capsule daily; Therapy: 79STZ2875 to (Evaluate:23Jun2017)  Requested for: 52BDQ1237; Last   Rx:23Feb2017 Ordered   9  Orphenadrine Citrate  MG Oral Tablet Extended Release 12 Hour; TAKE 1   TABLET TWICE DAILY AS NEEDED; Therapy: 51MBM0188 to (Evaluate:49Oie1508)  Requested for: 33HOG6337; Last   Rx:28Nov2016 Ordered   10  Oxybutynin Chloride ER 10 MG Oral Tablet Extended Release 24 Hour; Take 1 tablet    daily; Therapy: 01WGO3351 to (Evaluate:58Yld7705)  Requested for: 29MCS2537; Last    Rx:26May2016 Ordered   11  Reclast 5 MG/100ML Intravenous Solution (Zoledronic Acid); Therapy: (Recorded:36Rro7657) to Recorded   12  Saline Nasal Spray 0 65 % Nasal Solution; 2 spray each nostril twice a day; Therapy: 15SGP3676 to (Last Rx:21Nov2015) Ordered   13  Vitamin D3 1000 UNIT Oral Tablet; Therapy: (Recorded:28Nov2016) to Recorded    Allergies    1  Aspirin TABS   2  Penicillins    3  No Known Environmental Allergies   4   No Known Food Allergies    Signatures   Electronically signed by : Augie Garzon, ; Mar  3 2017 12:30PM EST                       (Author)

## 2018-01-16 NOTE — RESULT NOTES
Discussion/Summary   Your August labs have been reviewed  One of your liver enzymes is high-alk phol at 122-this can be up for various reasons -recommend that you review this with your PCP at a routine office visit  Your total cholesterol is a little high at 216 but your HDL-"good cholesterol" is high at 74 (this is good) and your LDL or "bad cholesterol" is only mildly high at 116-so overall your levels look o k  recommend that your review this with your PCP at a routine visit  Your vitamin B12 level is very high at 1992-this is generally safe and ok, but IF you are taking EXTRA vitamin b12, recommend that you decrease this to 3 days per week now  Please keep your routine office visit  If you do not have a scheduled routine appointment, please call the office to schedule it  Our office number is [phone]  If you have questions about your results, this will be discussed with you at your upcoming  visit  If you have gotten your most recent labs after your recent visit and have questions, please call the office  I look forward to seeing you at your next visit          Verified Results  (1) CBC/ PLT (NO DIFF) [id number] 09:46AM [facility] Order Number: [id number]     Test Name Result Flag Reference   HEMATOCRIT 39 0 %  34 8-46 1   HEMOGLOBIN 13 1 g/dL  11 5-15 4   MCHC 33 6 g/dL  31 4-37 4   MCH 31 0 pg  26 8-34 3   MCV 92 fL  82-98   PLATELET COUNT 334 Thousands/uL  149-390   RBC COUNT 4 22 Million/uL  3 81-5 12   RDW 13 1 %  11 6-15 1   WBC COUNT 4 54 Thousand/uL  4 31-10 16   MPV 10 0 fL  8 9-12 7     (1) COMPREHENSIVE METABOLIC PANEL [id number] 25:37XQ [facility] Order Number: [id number]     Test Name Result Flag Reference   SODIUM 139 mmol/L  136-145   POTASSIUM 4 0 mmol/L  3 5-5 3   CHLORIDE 105 mmol/L  100-108   CARBON DIOXIDE 28 mmol/L  21-32   ANION GAP (CALC) 6 mmol/L  4-13   BLOOD UREA NITROGEN 11 mg/dL  5-25   CREATININE 0 58 mg/dL L 0  60-1 30   Standardized to IDMS reference method   CALCIUM 9 5 mg/dL  8 3-10 1   BILI, TOTAL 0 41 mg/dL  0 20-1 00   ALK PHOSPHATAS 122 U/L H    ALT (SGPT) 66 U/L  12-78   Specimen collection should occur prior to Sulfasalazine and/or Sulfapyridine administration due to the potential for falsely depressed results  AST(SGOT) 28 U/L  5-45   Specimen collection should occur prior to Sulfasalazine administration due to the potential for falsely depressed results  ALBUMIN 3 9 g/dL  3 5-5 0   TOTAL PROTEIN 7 0 g/dL  6 4-8 2   eGFR 99 ml/min/1 73sq m     National Kidney Disease Education Program recommendations are as follows:  GFR calculation is accurate only with a steady state creatinine  Chronic Kidney disease less than 60 ml/min/1 73 sq  meters  Kidney failure less than 15 ml/min/1 73 sq  meters  GLUCOSE FASTING 107 mg/dL H 65-99   Specimen collection should occur prior to Sulfasalazine administration due to the potential for falsely depressed results  Specimen collection should occur prior to Sulfapyridine administration due to the potential for falsely elevated results  (1) FERRITIN 17Aug2017 09:46AM [employer] [facility] Order Number: [id number]     Test Name Result Flag Reference   FERRITIN 187 ng/mL  8-388     (1) FOLATE 67LNQ2918 09:46AM [facility] Order Number: [id number]     Test Name Result Flag Reference   FOLATE >20 0 ng/mL H 3 1-17 5     (1) LIPID PANEL, FASTING 17Aug2017 09:46AM [employer] [facility] Order Number: [id number]     Test Name Result Flag Reference   CHOLESTEROL 216 mg/dL H    HDL,DIRECT 74 mg/dL H 40-60   Specimen collection should occur prior to Metamizole administration due to the potential for falsley depressed results  LDL CHOLESTEROL CALCULATED 116 mg/dL H 0-100   Triglyceride:        Normal ??? ??? ??? ??? ??? ??? ??? <150 mg/dl   ??? ??? ???Borderline High ??? ??? 150-199 mg/dl   ??? ??? ? ?? High ??? ??? ??? ??? ??? ??? ??? 200-499 mg/dl   ??? ??? ? ??Very High ??? ??? ??? ??? ??? >499 mg/dl      Cholesterol:       Desirable ??? ??? ??? ??? <200 mg/dl   ??? ??? Borderline High ??? 200-239 mg/dl   ??? ??? High ??? ??? ??? ??? ??? ??? >239 mg/dl      HDL Cholesterol:       High ??? ???>59 mg/dL   ??? ??? Low ??? ??? <41 mg/dL      This screening LDL is a calculated result  It does not have the accuracy of the Direct Measured LDL in the monitoring of patients with hyperlipidemia and/or statin therapy  Direct Measure LDL (TKN965) must be ordered separately in these patients  TRIGLYCERIDES 132 mg/dL  <=150   Specimen collection should occur prior to N-Acetylcysteine or Metamizole administration due to the potential for falsely depressed results       (1) PTH N-TERMINAL (INTACT) 17Aug2017 09:46AM [facility] [place] Order Number: [id number]     Test Name Result Flag Reference   PARATHYROID HORMONE INTACT 44 6 pg/mL  14 0-72 0     (1) VITAMIN A 17Aug2017 09:46AM [facility] [place] Order Number: [id number]     Test Name Result Flag Reference   VITAMIN A 52 ug/dL  26 - 82   Performed at:  [address]  [id number]  : Nita Terrazas MD, Phone:  [phone]     (1) VITAMIN B1, WHOLE BLOOD 17Aug2017 09:46AM [facility] [place] Order Number: [id number]     Test Name Result Flag Reference   VITAMIN B1, WHOLE BLOOD 150 1 nmol/L  66 5 - 200 0   Performed at:  [address]  [id number]  : Nita Terrazas MD, Phone:  [phone]     (1) VITAMIN B12 63XCU4625 09:46AM [facility] [place] Order Number: [id number]     Test Name Result Flag Reference   VITAMIN B12 1992 pg/mL H 100-900     (1) VITAMIN D 25-HYDROXY 17Aug2017 09:46AM [facility] [place] Order Number: [id number]     Test Name Result Flag Reference   VIT D 25-HYDROX 51 6 ng/mL  30 0-100 0   This assay is a certified procedure of the CDC Vitamin D Standardization Certification Program (VDSCP)     Deficiency <20ng/ml   Insufficiency 20-30ng/ml   Sufficient  ng/ml     *Patients undergoing fluorescein dye angiography may retain small amounts of fluorescein in the body for 48-72 hours post procedure  Samples containing fluorescein can produce falsely elevated Vitamin D values  If the patient had this procedure, a specimen should be resubmitted post fluorescein clearance

## 2018-01-16 NOTE — PROGRESS NOTES
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  Colonoscopy is due after 04/12/2019  Mammogram is due after 08/12/2016 and she has the order for this  Pap test is due after 06/01/2013 and she Dr Raquel Sanders in 05/2016 we will fax for results  Tdap was last admin on 12/30/2010  Wants flu shot today  History of Present Illness  HPI: 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  got ppd 2 weeks ago      Review of Systems    Constitutional: No fever, no chills, feels well, no tiredness, no recent weight gain or weight loss  Eyes: No complaints of eye pain, no red eyes, no eyesight problems, no discharge, no dry eyes, no itching of eyes  ENT: no complaints of earache, no loss of hearing, no nose bleeds, no nasal discharge, no sore throat, no hoarseness  Cardiovascular: No complaints of slow heart rate, no fast heart rate, no chest pain, no palpitations, no leg claudication, no lower extremity edema  Respiratory: No complaints of shortness of breath, no wheezing, no cough, no SOB on exertion, no orthopnea, no PND  Gastrointestinal: No complaints of abdominal pain, no constipation, no nausea or vomiting, no diarrhea, no bloody stools  Genitourinary: No complaints of dysuria, no incontinence, no pelvic pain, no dysmenorrhea, no vaginal discharge or bleeding     Musculoskeletal: No complaints of arthralgias, no myalgias, no joint swelling or stiffness, no limb pain or swelling  Integumentary: No complaints of skin rash or lesions, no itching, no skin wounds, no breast pain or lump  Neurological: No complaints of headache, no confusion, no convulsions, no numbness, no dizziness or fainting, no tingling, no limb weakness, no difficulty walking  Psychiatric: Not suicidal, no sleep disturbance, no anxiety or depression, no change in personality, no emotional problems  Active Problems    1  Allergic rhinitis (477 9) (J30 9)   2  Closed Infrasyndesmotic Fracture Of The Left Lateral Malleolus With Posterior Medial   Fracture (Crispin-Mathew A3) (824 6)   3  Colon cancer screening (V76 51) (Z12 11)   4  Edema (782 3) (R60 9)   5  Elevated blood pressure reading without diagnosis of hypertension (796 2) (R03 0)   6  Encounter for routine gynecological examination (V72 31) (Z01 419)   7  Encounter for screening colonoscopy (V76 51) (Z12 11)   8  Encounter for screening mammogram for malignant neoplasm of breast (V76 12)   (Z12 31)   9  Generalized osteoarthritis of multiple sites (715 09) (M15 9)   10  Hyperlipidemia (272 4) (E78 5)   11  Need for prophylactic vaccination and inoculation against influenza (V04 81) (Z23)   12  Osteoarthritis of knee (715 36) (M17 9)   13  Osteoporosis (733 00) (M81 0)   14  Other urinary incontinence (788 39) (N39 498)   15  Pes planus, unspecified laterality (734) (M21 40)   16  Rosacea (695 3) (L71 9)   17  Sciatic pain, left (724 3) (M54 32)   18  Screening for osteoporosis (V82 81) (Z13 820)   19  Screening for tuberculosis (V74 1) (Z11 1)   20  Strain of thoracic region, initial encounter (847 1) (S29 019A)   21  Urinary tract infection (599 0) (N39 0)   22   Vitamin D deficiency (268 9) (E55 9)    Past Medical History    · History of Acute bronchitis (466 0) (J20 9)   · Acute sinusitis (461 9) (J01 90)   · History of Acute upper respiratory infection (465 9) (J06 9)   · History of Chronic Primary Lymphadenitis (289 1)   · History of acute pharyngitis (V12 69) (Z87 09)   · History of acute sinusitis (V12 69) (Z87 09)   · History of osteoporosis (V13 59) (Z87 39)   · History of Hordeolum externum, unspecified laterality (373 11) (H00 019)   · Need for prophylactic vaccination and inoculation against influenza (V04 81) (Z23)   · History of Synovial Cyst (727 40)   · History of Unspecified Muscle Strain (848 9)   · History of Urinary Tract Infection (V13 02)    Surgical History    · History of Knee Surgery   · History of Neuroplasty Decompression Median Nerve At Carpal Tunnel    Family History  Mother    · Family history of Mother  At Age 76  Father    · Family history of Father  At Age 58   · Family history of Heart Disease (V17 49)  Family History    · Family history of Cancer   · Family history of Endometriosis   · Family history of Osteoporosis (V17 81)    Social History    · Alcohol Use (History)   · minimal to none   · Caffeine use (V49 89) (F15 90)   · 2-3 cups iced tea   · Former smoker (V15 82) (Z87 891)   · No drug use   · Denied: History of Uses Safety Equipment   · smoke detectors   · Uses Safety Equipment - Seatbelts    Current Meds   1  Alendronate Sodium 70 MG Oral Tablet; 1 tab once a week 30 minutes before eating   with 8 ounces of water; Therapy: 78ZLX9660 to (Last Rx:2016)  Requested for: 95GZU1110 Ordered   2  Fluticasone Propionate 50 MCG/ACT Nasal Suspension; USE 2 SPRAYS IN EACH   NOSTRIL ONCE DAILY  Requested for: 2015; Last Rx:2015 Ordered   3  Hydrochlorothiazide 12 5 MG Oral Capsule; TAKE 1 CAPSULE DAILY; Therapy: 36TGM7562 to (Evaluate:2017)  Requested for: 05CMQ9537; Last   Rx:2016 Ordered   4  Oxybutynin Chloride ER 10 MG Oral Tablet Extended Release 24 Hour; Take 1 tablet   daily; Therapy: 61SMR9155 to (Evaluate:2017)  Requested for: 95TGF4428; Last   Rx:2016 Ordered   5  Reclast 5 MG/100ML Intravenous Solution;    Therapy: (Recorded:22Hqr2767) to Recorded   6  Saline Nasal Spray 0 65 % Nasal Solution; 2 spray each nostril twice a day; Therapy: 90SNM1539 to (Last Rx:21Nov2015) Ordered   7  Zyrtec 10 MG TABS; TAKE 1 TABLET AT BEDTIME; Therapy: (Recorded:12Ctt5744) to Recorded    Allergies    1  Aspirin TABS   2  Penicillins    3  No Known Environmental Allergies   4  No Known Food Allergies    Vitals   Recorded: 91CDT2572 77:12AO   Systolic 860   Diastolic 80   Heart Rate 84   Respiration 16   Height 5 ft 2 5 in   Weight 222 lb 9 oz   BMI Calculated 40 06   BSA Calculated 2 01     Physical Exam    Constitutional   General appearance: No acute distress, well appearing and well nourished  Eyes   Conjunctiva and lids: No swelling, erythema or discharge  Pupils and irises: Equal, round, reactive to light  Ears, Nose, Mouth, and Throat   Nasal mucosa, septum, and turbinates: Normal without edema or erythema  Lips, teeth, and gums: Normal, good dentition  Oropharynx: Normal with no erythema, edema, exudate or lesions  Neck   Neck: Supple, symmetric, trachea midline, no masses  Pulmonary   Auscultation of lungs: Clear to auscultation  Cardiovascular   Auscultation of heart: Normal rate and rhythm, normal S1 and S2, no murmurs  Carotid pulses: 2+ bilaterally  Abdomen   Abdomen: Non-tender, no masses  Liver and spleen: No hepatomegaly or splenomegaly  Lymphatic   Palpation of lymph nodes in neck: No lymphadenopathy  Musculoskeletal   Gait and station: Normal     Range of motion: Normal     Stability: Normal     Muscle strength/tone: Normal     Skin   Skin and subcutaneous tissue: Normal without rashes or lesions  Neurologic   Reflexes: 2+ and symmetric  Psychiatric   Orientation to person, place, and time: Normal     Mood and affect: Normal        Health Management  Colon cancer screening   COLONOSCOPY; every 3 years; Last 12Apr2016; Next Due: 12Apr2019;  Active  Encounter for routine gynecological examination   (every) THINPREP PAP; every 2 years; Last 73NBU8495; Next Due: 01Jun2013;  Overdue    Future Appointments    Date/Time Provider Specialty Site   09/20/2016 01:00 PM Bariatric 2, Nurse Schedule  [facility]     Signatures   Electronically signed by : PHILIPPE Pierson; Sep 15 2016 10:44AM EST                       (Author)    Electronically signed by : Evy Gomez DO; Sep 16 2016  4:48PM EST

## 2018-01-16 NOTE — PROGRESS NOTES
Chief Complaint  Chief Complaint Free Text Note Form: JYXSEUY Post-Op Visit: Reviewed post-operative pureed and soft foods diet with pt  Discussed gradual diet advancement and portion size progression  Discussed adequate hydration, signs and symptoms of dehydration  Discussed recommended post-operative vitamin supplementation and protein supplements  Discussed importance of regular physical activity  Provided pt with contact information for future questions/concerns  Active Problems    1  Allergic rhinitis (477 9) (J30 9)   2  Annual physical exam (V70 0) (Z00 00)   3  Blood tests prior to treatment or procedure (V72 63) (Z01 812)   4  Closed Infrasyndesmotic Fracture Of The Left Lateral Malleolus With Posterior Medial   Fracture (Crispin-Mathew A3) (824 6)   5  Colon cancer screening (V76 51) (Z12 11)   6  Edema (782 3) (R60 9)   7  Elevated BP without diagnosis of hypertension (796 2) (R03 0)   8  Encounter for routine gynecological examination (V72 31) (Z01 419)   9  Encounter for screening colonoscopy (V76 51) (Z12 11)   10  Encounter for screening mammogram for malignant neoplasm of breast (V76 12)    (Z12 31)   11  Generalized osteoarthritis of multiple sites (715 09) (M15 9)   12  Hyperlipidemia (272 4) (E78 5)   13  Morbid obesity (278 01) (E66 01)   14  Need for prophylactic vaccination and inoculation against influenza (V04 81) (Z23)   15  Osteoarthritis of knee (715 36) (M17 9)   16  Osteoporosis (733 00) (M81 0)   17  Other urinary incontinence (788 39) (N39 498)   18  Pes planus, unspecified laterality (734) (M21 40)   19  Preop cardiovascular exam (V72 81) (Z01 810)   20  Rosacea (695 3) (L71 9)   21  Sciatic pain, left (724 3) (M54 32)   22  Screening for osteoporosis (V82 81) (Z13 820)   23  Screening for tuberculosis (V74 1) (Z11 1)   24  Status post laparoscopic sleeve gastrectomy (V45 86) (Z98 84)   25  Strain of thoracic region, initial encounter (847 1) (S29 019A)   26   Strain, back (847 9) (S39 012A)   27  Urinary tract infection (599 0) (N39 0)   28  Vitamin D deficiency (268 9) (E55 9)    Past Medical History    1  History of Acute bronchitis (466 0) (J20 9)   2  Acute sinusitis (461 9) (J01 90)   3  History of Acute upper respiratory infection (465 9) (J06 9)   4  History of Chronic Primary Lymphadenitis (289 1)   5  History of acute pharyngitis (V12 69) (Z87 09)   6  History of acute sinusitis (V12 69) (Z87 09)   7  History of osteoporosis (V13 59) (Z87 39)   8  History of Hordeolum externum, unspecified laterality (373 11) (H00 019)   9  Need for prophylactic vaccination and inoculation against influenza (V04 81) (Z23)   10  History of Synovial Cyst (727 40)   11  History of Unspecified Muscle Strain (848 9)   12  History of Urinary Tract Infection (V13 02)    Surgical History    1  History of Foot Surgery   2  History of Gastrectomy Sleeve Laparoscopic   3  History of Knee Surgery   4  History of Neuroplasty Decompression Median Nerve At Carpal Tunnel   5  History of Rotator Cuff Repair   6  History of Salpingo-oophorectomy Bilateral Removal Of Solitary Ovary And Tube    Family History  Mother    1  Family history of Mother  At Age 76  Father    2  Family history of Father  At Age 60   1  Family history of Heart Disease (V17 49)  Family History    4  Family history of Cancer   5  Family history of Endometriosis   6  Family history of Osteoporosis (V17 81)    Social History    · Alcohol Use (History)   · Caffeine use (V49 89) (F15 90)   · Former smoker (V15 82) (Z87 891)   · No drug use   · Denied: History of Uses Safety Equipment   · Uses Safety Equipment - Seatbelts    Current Meds   1  Allegra Allergy 180 MG Oral Tablet (Fexofenadine HCl); TAKE 1 TABLET DAILY AS   NEEDED FOR ALLERGIES; Therapy: 28JBR2137 to Recorded   2  Bariatric Fusion Oral Tablet Chewable; Therapy: (Recorded:2017) to Recorded   3  Biotin 5000 MCG Oral Capsule;    Therapy: (Recorded:2016) to Recorded   4  Calcium CHEW;   Therapy: (Recorded:09Mar2017) to Recorded   5  Enoxaparin Sodium 40 MG/0 4ML Subcutaneous Solution (Lovenox); inject once daily for   2 weeks post surgery; Therapy: 45LFX2281 to (Last Rx:73Jic7321)  Requested for: 70Ukr3601 Ordered   6  Omeprazole 20 MG Oral Capsule Delayed Release; take 1 capsule daily; Therapy: 45KJK2435 to (Evaluate:23Jun2017)  Requested for: 23Feb2017; Last   Rx:23Feb2017 Ordered   7  Reclast 5 MG/100ML Intravenous Solution (Zoledronic Acid); Therapy: (Recorded:81Dcm8498) to Recorded   8  Saline Nasal Spray 0 65 % Nasal Solution; 2 spray each nostril twice a day; Therapy: 28UGH2253 to (Last Rx:21Nov2015) Ordered   9  Vitamin D3 1000 UNIT Oral Tablet; Therapy: (Recorded:28Nov2016) to Recorded    Allergies    1  Aspirin TABS   2  Penicillins    3  No Known Environmental Allergies   4  No Known Food Allergies    Vitals  Signs   Recorded: 17RUU6570 09:46AM   Temperature: 98 F  Heart Rate: 76  Respiration: 22  Systolic: 918  Diastolic: 70  Height: 5 ft 2 in  Weight: 209 lb 8 0 oz  BMI Calculated: 38 32  BSA Calculated: 1 95    Future Appointments    Date/Time Provider Specialty Site   03/13/2017 08:45 AM PARDEEP Owusu   Urology [facility] UROLOGY [facility]   06/13/2017 09:30 AM Sakshi Karimi, [address] [facility] General Surgery [facility]   04/11/2017 08:30 AM CRUZITO Slater, RD Dietitian [facility]     Signatures   Electronically signed by : CRUZITO Muñoz; Mar  9 2017 11:17AM EST                       (Author)    Electronically signed by : PARDEEP Mora ; Mar  9 2017 12:29PM EST                       (Validation)

## 2018-01-17 NOTE — PROGRESS NOTES
Discussion/Summary  Discussion Summary:   Assess: Pt here for monthly weigh in  Pt gained 2 pounds  She is still very limited with physical activity, PT was discontinued due to her limitations Pt does not have any significant changes in diagnosis or medication  Pt has accomplished the following goals: 3 meals per day uses the exercise bike 3 to 4 times per week for 20 to 30 minutes she is practicing the 30/60 rule no carbonation, make her own sugar free iced tea Nutrition Diagnosis: Obesity related to sedentary lifestyle and excess energy intake as evidenced by BMI Intervention: Reviewed work flow  All work flow tasks completed except for letter of support from pcp  Sent task to pcp requesting letter of support  Encouraged completion of lesson plan number 5 and 6 in bariatric booklet  Pt was receptive and verbalized understanding  Pt will work on the following goals: Decrease portions sizes by eating off of a smaller plate weigh herself weekly to keep herself accountable Monitoring/evaluation: Pt's case to be submitted for approval  Patient understands that she will be weighted at her final H & P and that she will need to follow the liver shrinking diet 2 weeks prior to surgery  Active Problems    1  Allergic rhinitis (477 9) (J30 9)   2  Annual physical exam (V70 0) (Z00 00)   3  Blood tests prior to treatment or procedure (V72 63) (Z01 812)   4  Closed Infrasyndesmotic Fracture Of The Left Lateral Malleolus With Posterior Medial   Fracture (Crispin-Mathew A3) (824 6)   5  Colon cancer screening (V76 51) (Z12 11)   6  Edema (782 3) (R60 9)   7  Elevated blood pressure reading without diagnosis of hypertension (796 2) (R03 0)   8  Encounter for routine gynecological examination (V72 31) (Z01 419)   9  Encounter for screening colonoscopy (V76 51) (Z12 11)   10  Encounter for screening mammogram for malignant neoplasm of breast (V76 12)    (Z12 31)   11   Generalized osteoarthritis of multiple sites (715 09) (M15 9)   12  Hyperlipidemia (272 4) (E78 5)   13  Morbid obesity (278 01) (E66 01)   14  Need for prophylactic vaccination and inoculation against influenza (V04 81) (Z23)   15  Osteoarthritis of knee (715 36) (M17 9)   16  Osteoporosis (733 00) (M81 0)   17  Other urinary incontinence (788 39) (N39 498)   18  Pes planus, unspecified laterality (734) (M21 40)   19  Preop cardiovascular exam (V72 81) (Z01 810)   20  Rosacea (695 3) (L71 9)   21  Sciatic pain, left (724 3) (M54 32)   22  Screening for osteoporosis (V82 81) (Z13 820)   23  Screening for tuberculosis (V74 1) (Z11 1)   24  Strain of thoracic region, initial encounter (847 1) (S29 019A)   25  Strain, back (847 9) (S39 012A)   26  Urinary tract infection (599 0) (N39 0)   27  Vitamin D deficiency (268 9) (E55 9)    Past Medical History    1  History of Acute bronchitis (466 0) (J20 9)   2  Acute sinusitis (461 9) (J01 90)   3  History of Acute upper respiratory infection (465 9) (J06 9)   4  History of Chronic Primary Lymphadenitis (289 1)   5  History of acute pharyngitis (V12 69) (Z87 09)   6  History of acute sinusitis (V12 69) (Z87 09)   7  History of osteoporosis (V13 59) (Z87 39)   8  History of Hordeolum externum, unspecified laterality (373 11) (H00 019)   9  Need for prophylactic vaccination and inoculation against influenza (V04 81) (Z23)   10  History of Synovial Cyst (727 40)   11  History of Unspecified Muscle Strain (848 9)   12  History of Urinary Tract Infection (V13 02)    Surgical History    1  History of Foot Surgery   2  History of Knee Surgery   3  History of Neuroplasty Decompression Median Nerve At Carpal Tunnel   4  History of Rotator Cuff Repair   5  History of Salpingo-oophorectomy Bilateral Removal Of Solitary Ovary And Tube    Family History  Mother    1  Family history of Mother  At Age 76  Father    2  Family history of Father  At Age 60   1  Family history of Heart Disease (V17 49)  Family History    4  Family history of Cancer   5  Family history of Endometriosis   6  Family history of Osteoporosis (V17 81)    Social History    · Alcohol Use (History)   · Caffeine use (V49 89) (F15 90)   · Former smoker (V15 82) (Z87 891)   · No drug use   · Denied: History of Uses Safety Equipment   · Uses Safety Equipment - Seatbelts    Current Meds   1  Allegra Allergy 180 MG Oral Tablet (Fexofenadine HCl); TAKE 1 TABLET DAILY AS   NEEDED FOR ALLERGIES; Therapy: 04CTH0370 to Recorded   2  Biotin 5000 MCG Oral Capsule; Therapy: (Recorded:28Nov2016) to Recorded   3  Caltrate 600+D TABS; Therapy: (Recorded:28Nov2016) to Recorded   4  Cranberry TABS; Therapy: (Recorded:28Nov2016) to Recorded   5  HydroCHLOROthiazide 12 5 MG Oral Capsule; TAKE 1 CAPSULE DAILY; Therapy: 13QNV3550 to (Evaluate:03Jan2017)  Requested for: 75HQL6809; Last   Rx:07Jun2016 Ordered   6  Multivitamins CAPS; Therapy: (Recorded:28Nov2016) to Recorded   7  Orphenadrine Citrate  MG Oral Tablet Extended Release 12 Hour; TAKE 1   TABLET TWICE DAILY AS NEEDED; Therapy: 47XLW3782 to (Evaluate:08Dec2016)  Requested for: 81XCM6548; Last   Rx:28Nov2016 Ordered   8  Oxybutynin Chloride ER 10 MG Oral Tablet Extended Release 24 Hour; Take 1 tablet   daily; Therapy: 51KVV3811 to (Evaluate:21May2017)  Requested for: 68YUB6849; Last   Rx:26May2016 Ordered   9  Reclast 5 MG/100ML Intravenous Solution (Zoledronic Acid); Therapy: (Recorded:26Tvc9148) to Recorded   10  Saline Nasal Spray 0 65 % Nasal Solution; 2 spray each nostril twice a day; Therapy: 89OSE0449 to (Last Rx:21Nov2015) Ordered   11  Vitamin D3 1000 UNIT Oral Tablet; Therapy: (Recorded:28Nov2016) to Recorded    Allergies    1  Aspirin TABS   2  Penicillins    3  No Known Environmental Allergies   4   No Known Food Allergies    Vitals  Signs   Recorded: 07Ghd7619 03:24PM   Height: 5 ft 2 in  Weight: 225 lb 12 8 oz  BMI Calculated: 41 30  BSA Calculated: 2 01    Signatures Electronically signed by : CRUZITO Monk; Dec 23 2016  3:25PM EST                       (Author)    Electronically signed by : PARDEEP Rich ; Dec 23 2016  3:42PM EST                       (Validation)

## 2018-01-17 NOTE — PROGRESS NOTES
Discussion/Summary  Discussion Summary:   Assess: Pt here for pre operative weight check Pt lost 2 8 pounds  Pt does not have any significant changes in diagnosis or medication  Per 24 hour recall: B: 1 hard boiled egg L: marina salad D: lean protein, vegetables and limited starch She has stopped all snacking  She drinks >64 ounces of home made sugar free iced tea, she is following the 30/60 rule, taking her vitamins/minerals as recommended  She is also using her stationary bike 3x per week  Nutrition Diagnosis: Obesity related to sedentary lifestyle and excess energy intake as evidenced by BMI Intervention: Reviewed work flow  Patient is precerted for surgery  She is referred to  to be scheduled for surgery  patient is aware that she will need to follow the liver shrinking diet 2 weeks prior to surgery  Answered questions concerning post op vitamin/mineral supplementation  Pt was receptive and verbalized understanding  Monitoring/evaluation: Pt will be scheduled for surgery  Understands that she will be weighed at her H & P  She has my contact information for any further questions /concerns  Active Problems    1  Allergic rhinitis (477 9) (J30 9)   2  Annual physical exam (V70 0) (Z00 00)   3  Blood tests prior to treatment or procedure (V72 63) (Z01 812)   4  Closed Infrasyndesmotic Fracture Of The Left Lateral Malleolus With Posterior Medial   Fracture (Crispin-Mathew A3) (824 6)   5  Colon cancer screening (V76 51) (Z12 11)   6  Edema (782 3) (R60 9)   7  Elevated blood pressure reading without diagnosis of hypertension (796 2) (R03 0)   8  Encounter for routine gynecological examination (V72 31) (Z01 419)   9  Encounter for screening colonoscopy (V76 51) (Z12 11)   10  Encounter for screening mammogram for malignant neoplasm of breast (V76 12)    (Z12 31)   11  Generalized osteoarthritis of multiple sites (715 09) (M15 9)   12  Hyperlipidemia (272 4) (E78 5)   13   Morbid obesity (278 01) (E66 01)   14  Need for prophylactic vaccination and inoculation against influenza (V04 81) (Z23)   15  Osteoarthritis of knee (715 36) (M17 9)   16  Osteoporosis (733 00) (M81 0)   17  Other urinary incontinence (788 39) (N39 498)   18  Pes planus, unspecified laterality (734) (M21 40)   19  Preop cardiovascular exam (V72 81) (Z01 810)   20  Rosacea (695 3) (L71 9)   21  Sciatic pain, left (724 3) (M54 32)   22  Screening for osteoporosis (V82 81) (Z13 820)   23  Screening for tuberculosis (V74 1) (Z11 1)   24  Strain of thoracic region, initial encounter (847 1) (S29 019A)   25  Strain, back (847 9) (S39 012A)   26  Urinary tract infection (599 0) (N39 0)   27  Vitamin D deficiency (268 9) (E55 9)    Past Medical History    1  History of Acute bronchitis (466 0) (J20 9)   2  Acute sinusitis (461 9) (J01 90)   3  History of Acute upper respiratory infection (465 9) (J06 9)   4  History of Chronic Primary Lymphadenitis (289 1)   5  History of acute pharyngitis (V12 69) (Z87 09)   6  History of acute sinusitis (V12 69) (Z87 09)   7  History of osteoporosis (V13 59) (Z87 39)   8  History of Hordeolum externum, unspecified laterality (373 11) (H00 019)   9  Need for prophylactic vaccination and inoculation against influenza (V04 81) (Z23)   10  History of Synovial Cyst (727 40)   11  History of Unspecified Muscle Strain (848 9)   12  History of Urinary Tract Infection (V13 02)    Surgical History    1  History of Foot Surgery   2  History of Knee Surgery   3  History of Neuroplasty Decompression Median Nerve At Carpal Tunnel   4  History of Rotator Cuff Repair   5  History of Salpingo-oophorectomy Bilateral Removal Of Solitary Ovary And Tube    Family History  Mother    1  Family history of Mother  At Age 76  Father    2  Family history of Father  At Age 60   1  Family history of Heart Disease (V17 49)  Family History    4  Family history of Cancer   5  Family history of Endometriosis   6  Family history of Osteoporosis (V17 81)    Social History    · Alcohol Use (History)   · Caffeine use (V49 89) (F15 90)   · Former smoker (V15 82) (Z87 891)   · No drug use   · Denied: History of Uses Safety Equipment   · Uses Safety Equipment - Seatbelts    Current Meds   1  Allegra Allergy 180 MG Oral Tablet (Fexofenadine HCl); TAKE 1 TABLET DAILY AS   NEEDED FOR ALLERGIES; Therapy: 48LRF3501 to Recorded   2  Biotin 5000 MCG Oral Capsule; Therapy: (Recorded:28Nov2016) to Recorded   3  Caltrate 600+D TABS; Therapy: (Recorded:28Nov2016) to Recorded   4  Cranberry TABS; Therapy: (Recorded:28Nov2016) to Recorded   5  HydroCHLOROthiazide 12 5 MG Oral Capsule; TAKE 1 CAPSULE DAILY; Therapy: 37DEB0354 to (Evaluate:03Jan2017)  Requested for: 46EKJ5101; Last   Rx:07Jun2016 Ordered   6  Multivitamins CAPS; Therapy: (Recorded:28Nov2016) to Recorded   7  Orphenadrine Citrate  MG Oral Tablet Extended Release 12 Hour; TAKE 1   TABLET TWICE DAILY AS NEEDED; Therapy: 46DHM5328 to (Evaluate:40Jai5896)  Requested for: 09PAD0094; Last   Rx:28Nov2016 Ordered   8  Oxybutynin Chloride ER 10 MG Oral Tablet Extended Release 24 Hour; Take 1 tablet   daily; Therapy: 23ZAJ4198 to (Evaluate:01Cnd8384)  Requested for: 62DYH4150; Last   Rx:26May2016 Ordered   9  Reclast 5 MG/100ML Intravenous Solution (Zoledronic Acid); Therapy: (Recorded:79Ovt7554) to Recorded   10  Saline Nasal Spray 0 65 % Nasal Solution; 2 spray each nostril twice a day; Therapy: 96FAW5407 to (Last Rx:21Nov2015) Ordered   11  Vitamin D3 1000 UNIT Oral Tablet; Therapy: (Recorded:28Nov2016) to Recorded    Allergies    1  Aspirin TABS   2  Penicillins    3  No Known Environmental Allergies   4   No Known Food Allergies    Vitals  Signs   Recorded: 96MFS7504 09:51AM   Height: 5 ft 2 in  Weight: 223 lb   BMI Calculated: 40 79  BSA Calculated: 2 00    Signatures   Electronically signed by : CRUZITO Mullen; Jan 12 2017  9:52AM EST (Author)    Electronically signed by : Brookie Holstein, M D ; Jan 12 2017 12:16PM EST                       (Validation)

## 2018-02-13 DIAGNOSIS — E78.5 HYPERLIPIDEMIA: ICD-10-CM

## 2018-02-13 DIAGNOSIS — E66.9 OBESITY: ICD-10-CM

## 2018-02-13 DIAGNOSIS — Z98.84 BARIATRIC SURGERY STATUS: ICD-10-CM

## 2018-02-13 DIAGNOSIS — K59.09 OTHER CONSTIPATION: ICD-10-CM

## 2018-02-13 DIAGNOSIS — Z00.00 ENCOUNTER FOR GENERAL ADULT MEDICAL EXAMINATION WITHOUT ABNORMAL FINDINGS: ICD-10-CM

## 2018-02-13 DIAGNOSIS — K91.2 POSTSURGICAL MALABSORPTION, NOT ELSEWHERE CLASSIFIED: ICD-10-CM

## 2018-02-22 ENCOUNTER — TRANSCRIBE ORDERS (OUTPATIENT)
Dept: ADMINISTRATIVE | Facility: HOSPITAL | Age: 63
End: 2018-02-22

## 2018-02-22 DIAGNOSIS — M81.0 OSTEOPOROSIS, UNSPECIFIED OSTEOPOROSIS TYPE, UNSPECIFIED PATHOLOGICAL FRACTURE PRESENCE: ICD-10-CM

## 2018-02-22 DIAGNOSIS — E55.9 VITAMIN D INSUFFICIENCY: Primary | ICD-10-CM

## 2018-03-05 ENCOUNTER — OFFICE VISIT (OUTPATIENT)
Dept: UROLOGY | Facility: CLINIC | Age: 63
End: 2018-03-05
Payer: COMMERCIAL

## 2018-03-05 VITALS
HEIGHT: 62 IN | BODY MASS INDEX: 32.39 KG/M2 | SYSTOLIC BLOOD PRESSURE: 122 MMHG | WEIGHT: 176 LBS | HEART RATE: 84 BPM | DIASTOLIC BLOOD PRESSURE: 76 MMHG

## 2018-03-05 DIAGNOSIS — N39.46 MIXED INCONTINENCE: Primary | ICD-10-CM

## 2018-03-05 DIAGNOSIS — N95.2 ATROPHIC VAGINITIS: ICD-10-CM

## 2018-03-05 DIAGNOSIS — N36.44 MUSCULAR DISORDERS OF URETHRA: ICD-10-CM

## 2018-03-05 DIAGNOSIS — N39.8 VOIDING DYSFUNCTION: Primary | ICD-10-CM

## 2018-03-05 LAB
SL AMB  POCT GLUCOSE, UA: NORMAL
SL AMB LEUKOCYTE ESTERASE,UA: NORMAL
SL AMB POCT CLARITY,UA: CLEAR
SL AMB POCT COLOR,UA: YELLOW
SL AMB POCT KETONES,UA: NORMAL
SL AMB POCT NITRITE,UA: NORMAL
SL AMB POCT PH,UA: 8
SL AMB POCT URINE PROTEIN: NORMAL

## 2018-03-05 PROCEDURE — 99213 OFFICE O/P EST LOW 20 MIN: CPT | Performed by: UROLOGY

## 2018-03-05 PROCEDURE — 81002 URINALYSIS NONAUTO W/O SCOPE: CPT | Performed by: UROLOGY

## 2018-03-05 PROCEDURE — 51798 US URINE CAPACITY MEASURE: CPT | Performed by: UROLOGY

## 2018-03-05 RX ORDER — SENNOSIDES 8.6 MG
650 CAPSULE ORAL DAILY
COMMUNITY

## 2018-03-05 NOTE — PROGRESS NOTES
Progress Note - Urology  Bridget Harada 58 y o  female MRN: [id number]  Encounter: [id number]      Chief Complaint:   Chief Complaint   Patient presents with    Urinary Incontinence     Go Over CMG from December       HPI:      Operation presented for evaluation of voiding dysfunction  Primarily that of urgency type leakage  She does wear light pad  Her cystometric study showed what appeared to be relatively normal urodynamic study  Her postvoid residual has also been variable  She has been measured at 70 mL  Today she measures a postvoid residual of 181 mL    MEDS:    Current Outpatient Prescriptions:     acetaminophen (TYLENOL ARTHRITIS PAIN) 650 mg CR tablet, Take 650 mg by mouth daily, Disp: , Rfl:     ascorbic acid (VITAMIN C) 500 mg tablet, Take 500 mg by mouth daily  , Disp: , Rfl:     Biotin 5000 MCG TABS, Take 5,000 mcg by mouth daily  , Disp: , Rfl:     Calcium-Vitamin D (CALTRATE 600 PLUS-VIT D PO), Take 1 tablet by mouth daily  , Disp: , Rfl:     cetirizine (ZyrTEC) 10 mg tablet, Take 10 mg by mouth daily, Disp: , Rfl:     Cholecalciferol (VITAMIN D3) 5000 UNITS CAPS, Take 5,000 Units by mouth daily  , Disp: , Rfl:     Cranberry 1000 MG CAPS, Take 1,000 mg by mouth daily  , Disp: , Rfl:     fluticasone (VERAMYST) 27 5 MCG/SPRAY nasal spray, 2 sprays into each nostril daily as needed  , Disp: , Rfl:     Multiple Vitamins-Minerals (CENTRUM MULTIGUMMIES) CHEW, Chew 1 tablet daily  , Disp: , Rfl:     NON FORMULARY, Apply 1 application topically daily at bedtime [employer] eye ointment to right eye, every night  , Disp: , Rfl:     omeprazole (PriLOSEC) 20 mg delayed release capsule, Take 1 capsule by mouth daily for 30 days, Disp: 30 capsule, Rfl: 0    tamsulosin (FLOMAX) 0 4 mg, Take 1 capsule by mouth daily with dinner for 30 days, Disp: 30 capsule, Rfl: 0    Zoledronic Acid (RECLAST IV), Infuse into a venous catheter One infusion yearly last dose in august , Disp: , Rfl: PMH:  Past Medical History:   Diagnosis Date    Ankle pain, left     Arthritis     Edema extremities     left leg    Eye injury     right approx 15 yrs ago    Food allergy     bell peppers    History of pneumonia     Hyperlipidemia     Knee pain, left     Obesity     Osteoporosis     Seasonal allergies     Urinary incontinence     Wears contact lenses     left eye    Wears glasses     Wears partial dentures     lower         ROS:  Review of Systems   Constitutional: Negative  Respiratory: Negative  Cardiovascular: Negative  Neurological: Negative  Vitals:  Blood pressure 122/76, pulse 84, height 5' 2" (1 575 m), weight 79 8 kg (176 lb), not currently breastfeeding  Physical Exam:    no clinical examination performed today  A postvoid residual was obtained by ultrasound  This showed a volume of 181 mL  On previous examination she demonstrated atrophic vaginitis  Lab, Imaging and other studies:  Recent Results (from the past 48 hour(s))   POCT urine dip    Collection Time: 03/05/18 10:49 AM   Result Value Ref Range    LEUKOCYTE ESTERASE,UA ++      NITRITE,UA neg     SL AMB POCT URINE PROTEIN trace      PH,UA 8      KETONES,UA neg     GLUCOSE, UA neg      COLOR,UA yellow      CLARITY,UA clear          IMPRESSION:    1  Urgency urinary incontinence   2  Atrophic vaginitis    PLAN:   plan to add Premarin vaginal cream   Also reinforced use of Kegel exercises  We explained her cystometric study and recommendations for management of the incontinence  This includes also timed voiding    Recheck her in 6 months

## 2018-03-06 RX ORDER — TAMSULOSIN HYDROCHLORIDE 0.4 MG/1
CAPSULE ORAL
Qty: 90 CAPSULE | Refills: 3 | Status: SHIPPED | OUTPATIENT
Start: 2018-03-06 | End: 2018-09-12 | Stop reason: SDUPTHER

## 2018-03-09 ENCOUNTER — HOSPITAL ENCOUNTER (OUTPATIENT)
Dept: BONE DENSITY | Facility: IMAGING CENTER | Age: 63
Discharge: HOME | End: 2018-03-09
Payer: COMMERCIAL

## 2018-03-09 DIAGNOSIS — M81.0 OSTEOPOROSIS, UNSPECIFIED OSTEOPOROSIS TYPE, UNSPECIFIED PATHOLOGICAL FRACTURE PRESENCE: ICD-10-CM

## 2018-03-09 PROCEDURE — 77080 DXA BONE DENSITY AXIAL: CPT

## 2018-03-09 RX ORDER — OMEPRAZOLE 20 MG/1
1 CAPSULE, DELAYED RELEASE ORAL DAILY
COMMUNITY
Start: 2017-02-23 | End: 2018-09-12 | Stop reason: SDUPTHER

## 2018-03-09 RX ORDER — ECHINACEA PURPUREA EXTRACT 125 MG
2 TABLET ORAL 2 TIMES DAILY
COMMUNITY
Start: 2017-07-15 | End: 2018-09-27 | Stop reason: ALTCHOICE

## 2018-03-09 RX ORDER — OXYBUTYNIN CHLORIDE 5 MG/1
5 TABLET ORAL
COMMUNITY
End: 2018-09-12 | Stop reason: ALTCHOICE

## 2018-03-09 RX ORDER — FEXOFENADINE HCL AND PSEUDOEPHEDRINE HCI 60; 120 MG/1; MG/1
1 TABLET, EXTENDED RELEASE ORAL
COMMUNITY
End: 2018-09-12 | Stop reason: ALTCHOICE

## 2018-03-09 RX ORDER — CETIRIZINE HYDROCHLORIDE 10 MG/1
TABLET ORAL
COMMUNITY

## 2018-03-09 RX ORDER — ALENDRONATE SODIUM 70 MG/1
70 TABLET ORAL
COMMUNITY
End: 2018-09-27 | Stop reason: ALTCHOICE

## 2018-03-12 ENCOUNTER — TELEPHONE (OUTPATIENT)
Dept: BARIATRICS | Facility: CLINIC | Age: 63
End: 2018-03-12

## 2018-04-22 DIAGNOSIS — Z98.84 BARIATRIC SURGERY STATUS: Primary | ICD-10-CM

## 2018-04-23 RX ORDER — OMEPRAZOLE 20 MG/1
CAPSULE, DELAYED RELEASE ORAL
Qty: 90 CAPSULE | Refills: 0 | Status: SHIPPED | OUTPATIENT
Start: 2018-04-23 | End: 2018-07-20 | Stop reason: SDUPTHER

## 2018-04-25 ENCOUNTER — TELEPHONE (OUTPATIENT)
Dept: UROLOGY | Facility: CLINIC | Age: 63
End: 2018-04-25

## 2018-04-25 NOTE — TELEPHONE ENCOUNTER
Call to patient to assess kegel exercises and progress  C/o leakage on way to the bathroom, but feel the leakage has lessened  Patient feels her emptying is more of a problem, after voiding she has to go back within a few minutes  Instructed in double voiding by leaning back (has tried bending forward) and portia pelvic floor for 3 sec then relax for 10sec and waiting a little longer to see if this helps empty more at that time  Is doing kegels when thinks about it, is cleaning out house and is planning to move to [place] in October

## 2018-06-04 ENCOUNTER — APPOINTMENT (OUTPATIENT)
Dept: LAB | Facility: CLINIC | Age: 63
End: 2018-06-04
Payer: COMMERCIAL

## 2018-06-04 DIAGNOSIS — K91.2 POSTSURGICAL MALABSORPTION, NOT ELSEWHERE CLASSIFIED: ICD-10-CM

## 2018-06-04 DIAGNOSIS — E66.9 OBESITY: ICD-10-CM

## 2018-06-04 DIAGNOSIS — Z98.84 BARIATRIC SURGERY STATUS: ICD-10-CM

## 2018-06-04 DIAGNOSIS — K59.09 OTHER CONSTIPATION: ICD-10-CM

## 2018-06-04 DIAGNOSIS — E78.5 HYPERLIPIDEMIA: ICD-10-CM

## 2018-06-04 DIAGNOSIS — Z00.00 ENCOUNTER FOR GENERAL ADULT MEDICAL EXAMINATION WITHOUT ABNORMAL FINDINGS: ICD-10-CM

## 2018-06-04 LAB
25(OH)D3 SERPL-MCNC: 45.7 NG/ML (ref 30–100)
ALBUMIN SERPL BCP-MCNC: 4.3 G/DL (ref 3.5–5)
ALP SERPL-CCNC: 100 U/L (ref 46–116)
ALT SERPL W P-5'-P-CCNC: 35 U/L (ref 12–78)
ANION GAP SERPL CALCULATED.3IONS-SCNC: 6 MMOL/L (ref 4–13)
AST SERPL W P-5'-P-CCNC: 17 U/L (ref 5–45)
BILIRUB SERPL-MCNC: 0.52 MG/DL (ref 0.2–1)
BUN SERPL-MCNC: 10 MG/DL (ref 5–25)
CALCIUM SERPL-MCNC: 9.4 MG/DL (ref 8.3–10.1)
CHLORIDE SERPL-SCNC: 102 MMOL/L (ref 100–108)
CHOLEST SERPL-MCNC: 216 MG/DL (ref 50–200)
CO2 SERPL-SCNC: 31 MMOL/L (ref 21–32)
CREAT SERPL-MCNC: 0.62 MG/DL (ref 0.6–1.3)
ERYTHROCYTE [DISTWIDTH] IN BLOOD BY AUTOMATED COUNT: 12.3 % (ref 11.6–15.1)
FERRITIN SERPL-MCNC: 151 NG/ML (ref 8–388)
FOLATE SERPL-MCNC: >20 NG/ML (ref 3.1–17.5)
GFR SERPL CREATININE-BSD FRML MDRD: 97 ML/MIN/1.73SQ M
GLUCOSE P FAST SERPL-MCNC: 87 MG/DL (ref 65–99)
HCT VFR BLD AUTO: 41.1 % (ref 34.8–46.1)
HDLC SERPL-MCNC: 97 MG/DL (ref 40–60)
HGB BLD-MCNC: 13.6 G/DL (ref 11.5–15.4)
LDLC SERPL CALC-MCNC: 105 MG/DL (ref 0–100)
MCH RBC QN AUTO: 31.5 PG (ref 26.8–34.3)
MCHC RBC AUTO-ENTMCNC: 33.1 G/DL (ref 31.4–37.4)
MCV RBC AUTO: 95 FL (ref 82–98)
NONHDLC SERPL-MCNC: 119 MG/DL
PLATELET # BLD AUTO: 226 THOUSANDS/UL (ref 149–390)
PMV BLD AUTO: 9.9 FL (ref 8.9–12.7)
POTASSIUM SERPL-SCNC: 4.3 MMOL/L (ref 3.5–5.3)
PROT SERPL-MCNC: 7.4 G/DL (ref 6.4–8.2)
PTH-INTACT SERPL-MCNC: 46.6 PG/ML (ref 18.4–80.1)
RBC # BLD AUTO: 4.32 MILLION/UL (ref 3.81–5.12)
SODIUM SERPL-SCNC: 139 MMOL/L (ref 136–145)
TRIGL SERPL-MCNC: 70 MG/DL
VIT B12 SERPL-MCNC: 1111 PG/ML (ref 100–900)
WBC # BLD AUTO: 4.97 THOUSAND/UL (ref 4.31–10.16)

## 2018-06-04 PROCEDURE — 84425 ASSAY OF VITAMIN B-1: CPT

## 2018-06-04 PROCEDURE — 82728 ASSAY OF FERRITIN: CPT

## 2018-06-04 PROCEDURE — 84590 ASSAY OF VITAMIN A: CPT

## 2018-06-04 PROCEDURE — 82607 VITAMIN B-12: CPT

## 2018-06-04 PROCEDURE — 85027 COMPLETE CBC AUTOMATED: CPT

## 2018-06-04 PROCEDURE — 80053 COMPREHEN METABOLIC PANEL: CPT

## 2018-06-04 PROCEDURE — 36415 COLL VENOUS BLD VENIPUNCTURE: CPT

## 2018-06-04 PROCEDURE — 83970 ASSAY OF PARATHORMONE: CPT

## 2018-06-04 PROCEDURE — 82306 VITAMIN D 25 HYDROXY: CPT

## 2018-06-04 PROCEDURE — 80061 LIPID PANEL: CPT

## 2018-06-04 PROCEDURE — 82746 ASSAY OF FOLIC ACID SERUM: CPT

## 2018-06-07 LAB
VIT A SERPL-MCNC: 43.2 UG/DL (ref 36.4–108)
VIT B1 BLD-SCNC: 157.4 NMOL/L (ref 66.5–200)

## 2018-06-21 DIAGNOSIS — J30.2 SEASONAL ALLERGIC RHINITIS, UNSPECIFIED TRIGGER: Primary | ICD-10-CM

## 2018-06-21 RX ORDER — CETIRIZINE HYDROCHLORIDE, PSEUDOEPHEDRINE HYDROCHLORIDE 5; 120 MG/1; MG/1
1 TABLET, FILM COATED, EXTENDED RELEASE ORAL 2 TIMES DAILY
Qty: 60 TABLET | Refills: 3 | Status: SHIPPED | OUTPATIENT
Start: 2018-06-21 | End: 2018-09-19 | Stop reason: SDUPTHER

## 2018-07-09 ENCOUNTER — OFFICE VISIT (OUTPATIENT)
Dept: BARIATRICS | Facility: CLINIC | Age: 63
End: 2018-07-09
Payer: COMMERCIAL

## 2018-07-09 VITALS
WEIGHT: 167.5 LBS | BODY MASS INDEX: 30.82 KG/M2 | DIASTOLIC BLOOD PRESSURE: 70 MMHG | RESPIRATION RATE: 18 BRPM | SYSTOLIC BLOOD PRESSURE: 104 MMHG | TEMPERATURE: 98 F | HEART RATE: 85 BPM | HEIGHT: 62 IN

## 2018-07-09 DIAGNOSIS — Z98.84 BARIATRIC SURGERY STATUS: Primary | ICD-10-CM

## 2018-07-09 DIAGNOSIS — K91.2 POSTSURGICAL MALABSORPTION: ICD-10-CM

## 2018-07-09 PROBLEM — E66.01 MORBID OBESITY DUE TO EXCESS CALORIES (HCC): Status: RESOLVED | Noted: 2017-03-01 | Resolved: 2018-07-09

## 2018-07-09 PROCEDURE — 99213 OFFICE O/P EST LOW 20 MIN: CPT | Performed by: PHYSICIAN ASSISTANT

## 2018-07-09 NOTE — ASSESSMENT & PLAN NOTE
She gets IV bisphosphinate (? Zoltronex)- through Rheumatologist   Encouraged to follow routinely there and continue her bariatric supplements

## 2018-07-09 NOTE — ASSESSMENT & PLAN NOTE
-At risk for malabsorption of vitamins/minerals secondary to malabsorption and restriction of intake from their procedure  -Currently taking adequate postop bariatric surgery vitamin supplementation-yes  -Last set of bariatric labs completed on 6/18 and are within acceptable limits   -Next set of bariatric labs ordered for approximately after her annual visit     She is taking 4 bariatric fusion mvi and one celebrate calcium- and extra biotin and 5000 IU vitamin D3 daily and is maintaining vitamin/mineral levels- encouraged to continue same    Advised she can consider discontinuing biotin at this point

## 2018-07-09 NOTE — PATIENT INSTRUCTIONS
If you move to [place], I would recommend still establishing with a bariatric center of excellence there  I will be happy to continue to see you yearly if you choose to see us  Follow-up in one year  We kindly ask that you arrive 15 minutes before your scheduled appointment time with your provider to allow you to be roomed, have your vital signs checked and your chart updated by our staff  We thank you for your patience at your visit  Follow diet as discussed  Follow  vitamin and mineral recommendations as reviewed with you  Exercise as tolerated    If you have gotten a lab slip at this visit, please note that most labs are FASTING-but you need to drink water the night before and the morning before your labs are done  It is HIGHLY RECOMMENDED that you check with your insurance to make sure all the labs ordered are covered by your individual insurance policy  This is especially important if you also get labs done by other providers outside of [facility]  You want to avoid having duplicate labs done  Note you will be given a lab slip AFTER  your annual visit next year to check your vitamin and mineral levels  You will not need to make a second appointment after the labs are received/reviewed  You will receive a letter/and or phone call with your results  Most labs do NOT honor a lab slip dated one year in advance now  Call our office if he have any problems with abdominal pain especially if associated with fever, chills, nausea, vomiting or any other concerns  All  Post-bariatric surgery patients should be aware that very small quantities of any alcohol  can cause impairment and it is very possible not to feel the effect  The effect can be in the system for several hours  It is also a stomach irritant  It is advised to AVOID alcohol, Nonsteroidal antiinflammatory drugs (NSAIDS) and nicotine of all forms   Any of these can cause stomach irritation/pain

## 2018-07-09 NOTE — PROGRESS NOTES
Assessment/Plan:    Bariatric surgery status  -s/p Vertical Sleeve Gastrectomy with Dr Ammy Dueñas on 2/28/2017  Overall doing Well  Initial:224 lb  Current: 167 lb  EWL: 64% which is expected for this time frame  Eric: Current  Current BMI is Body mass index is 30 64 kg/m²  Tolerating a regular diet-yes  Eating at least 60 grams of protein per day-yes  Following 30/60 minute rule with liquids-yes  Drinking at least 64 ounces of fluid per day-yes  Drinking carbonated beverages-no  Sufficient exercise-yes  Using NSAIDs regularly-no  Using nicotine-no  Using alcohol-occasional 1 shot of vodka /twice a week/ for "sleep"-advised of effect of alcohol after gastric surgery and advised that this does not induce good restful sleep-advised to avoid but she is not receptive to instructions    She reports alternating loose bowels and constipation-did somewhat better switching milk to almond milk and ok with other dairy so she likely has some lactose intolerance-despite diet changes she is still alternating with loose bowels/constipation  No black/tarry or bloody stools  No nocturnal stools  She reports she had colonoscopy just prior to her gastric surgery with polypectomy -advised she would likely benefit from follow-up with GI and/or PCP for bowel regimen   She denies any family history of Colon CA    Postsurgical malabsorption  -At risk for malabsorption of vitamins/minerals secondary to malabsorption and restriction of intake from their procedure  -Currently taking adequate postop bariatric surgery vitamin supplementation-yes  -Last set of bariatric labs completed on 6/18 and are within acceptable limits   -Next set of bariatric labs ordered for approximately after her annual visit     She is taking 4 bariatric fusion mvi and one celebrate calcium- and extra biotin and 5000 IU vitamin D3 daily and is maintaining vitamin/mineral levels- encouraged to continue same    Advised she can consider discontinuing biotin at this point        Osteoporosis  She gets IV bisphosphinate (? Zoltronex)- through Rheumatologist   Encouraged to follow routinely there and continue her bariatric supplements  Diagnoses and all orders for this visit:    Bariatric surgery status    Postsurgical malabsorption          Subjective:      Patient ID: Aris Perkins is a 61 y o  female  She is here in routine follow-up  She is tolerating a regular diet  She is taking bariatric supplements and exercising  She notes she has issues with alternating loose stools with constipation  No black/tarry or bloody stools  No nocturnal stools  She reports she had colonoscopy right before her bariatric surgery  She did cut out liquid milk which helped to some degree but still has issues with alternating stools  She has no other concerns today related to her surgery  The following portions of the patient's history were reviewed and updated as appropriate: allergies, current medications, past family history, past medical history, past social history, past surgical history and problem list     Review of Systems   Constitutional: Negative for chills and fever  Unexpected weight change: planned weight loss  Respiratory: Negative for shortness of breath and wheezing  Cardiovascular: Negative for chest pain and palpitations  Gastrointestinal: Positive for constipation  Negative for abdominal pain, blood in stool, diarrhea, nausea and vomiting  Alternating loose stools; no black/tarry stools, no nocturnal stools   Psychiatric/Behavioral: Suicidal ideas: no complait of anxiety or depression  Objective:      /70   Pulse 85   Temp 98 °F (36 7 °C)   Resp 18   Ht 5' 2" (1 575 m)   Wt 76 kg (167 lb 8 oz)   BMI 30 64 kg/m²          Physical Exam   Constitutional: She is oriented to person, place, and time  She appears well-developed and well-nourished     HENT:   Mouth/Throat: Oropharynx is clear and moist    Eyes: Conjunctivae are normal  No scleral icterus  Cardiovascular: Normal rate and regular rhythm  Pulmonary/Chest: Effort normal and breath sounds normal    Abdominal: Soft  There is no tenderness  No incisional hernias appreciated   Musculoskeletal:   Normal gait   Neurological: She is alert and oriented to person, place, and time  Psychiatric: She has a normal mood and affect  Her behavior is normal  Judgment and thought content normal    Nursing note and vitals reviewed  GOALS:   Maintain +/- Continued weight loss with good nutrition intakes    Normal vitamin and mineral levels  Exercise as tolerated    BARRIERS: none identified

## 2018-07-09 NOTE — ASSESSMENT & PLAN NOTE
-s/p Vertical Sleeve Gastrectomy with Dr John Palumbo on 2/28/2017  Overall doing Well  Initial:224 lb  Current: 167 lb  EWL: 64% which is expected for this time frame  Eric: Current  Current BMI is Body mass index is 30 64 kg/m²  Tolerating a regular diet-yes  Eating at least 60 grams of protein per day-yes  Following 30/60 minute rule with liquids-yes  Drinking at least 64 ounces of fluid per day-yes  Drinking carbonated beverages-no  Sufficient exercise-yes  Using NSAIDs regularly-no  Using nicotine-no  Using alcohol-occasional 1 shot of vodka /twice a week/ for "sleep"-advised of effect of alcohol after gastric surgery and advised that this does not induce good restful sleep-advised to avoid but she is not receptive to instructions    She reports alternating loose bowels and constipation-did somewhat better switching milk to almond milk and ok with other dairy so she likely has some lactose intolerance-despite diet changes she is still alternating with loose bowels/constipation  No black/tarry or bloody stools  No nocturnal stools  She reports she had colonoscopy just prior to her gastric surgery with polypectomy -advised she would likely benefit from follow-up with GI and/or PCP for bowel regimen  She denies any family history of Colon CA    She is likely moving to [place], [place] in October 2018 but is still considering routinely seeing us   Advised she may still benefit from establishing with [facility] there as well in case she would need anything more immediately

## 2018-07-11 ENCOUNTER — TELEPHONE (OUTPATIENT)
Dept: UROLOGY | Facility: CLINIC | Age: 63
End: 2018-07-11

## 2018-07-11 NOTE — TELEPHONE ENCOUNTER
Spoke to patient regarding her feeling of incomplete emptying  Is doing double voiding and she feels she is emptying better  Continues to do kegel exercises and has no questions or complaints  Has appointment with Dr Andres Mcmanus in September and she will keept this appointment

## 2018-07-20 DIAGNOSIS — Z98.84 BARIATRIC SURGERY STATUS: ICD-10-CM

## 2018-07-20 RX ORDER — OMEPRAZOLE 20 MG/1
CAPSULE, DELAYED RELEASE ORAL
Qty: 90 CAPSULE | Refills: 0 | Status: SHIPPED | OUTPATIENT
Start: 2018-07-20 | End: 2018-09-27 | Stop reason: ALTCHOICE

## 2018-09-12 ENCOUNTER — OFFICE VISIT (OUTPATIENT)
Dept: UROLOGY | Facility: CLINIC | Age: 63
End: 2018-09-12
Payer: COMMERCIAL

## 2018-09-12 VITALS
BODY MASS INDEX: 29.44 KG/M2 | HEART RATE: 80 BPM | SYSTOLIC BLOOD PRESSURE: 120 MMHG | HEIGHT: 62 IN | DIASTOLIC BLOOD PRESSURE: 78 MMHG | WEIGHT: 160 LBS

## 2018-09-12 DIAGNOSIS — N39.8 VOIDING DYSFUNCTION: ICD-10-CM

## 2018-09-12 DIAGNOSIS — N39.46 MIXED INCONTINENCE: Primary | ICD-10-CM

## 2018-09-12 DIAGNOSIS — N95.2 ATROPHIC VAGINITIS: ICD-10-CM

## 2018-09-12 LAB
POST-VOID RESIDUAL VOLUME, ML POC: 243 ML
SL AMB  POCT GLUCOSE, UA: NORMAL
SL AMB LEUKOCYTE ESTERASE,UA: NORMAL
SL AMB POCT BILIRUBIN,UA: NORMAL
SL AMB POCT BLOOD,UA: NORMAL
SL AMB POCT CLARITY,UA: CLEAR
SL AMB POCT COLOR,UA: YELLOW
SL AMB POCT KETONES,UA: NORMAL
SL AMB POCT NITRITE,UA: NORMAL
SL AMB POCT PH,UA: 8
SL AMB POCT SPECIFIC GRAVITY,UA: 1
SL AMB POCT URINE PROTEIN: NORMAL
SL AMB POCT UROBILINOGEN: NORMAL

## 2018-09-12 PROCEDURE — 51798 US URINE CAPACITY MEASURE: CPT | Performed by: UROLOGY

## 2018-09-12 PROCEDURE — 81002 URINALYSIS NONAUTO W/O SCOPE: CPT | Performed by: UROLOGY

## 2018-09-12 PROCEDURE — 99213 OFFICE O/P EST LOW 20 MIN: CPT | Performed by: UROLOGY

## 2018-09-12 RX ORDER — TAMSULOSIN HYDROCHLORIDE 0.4 MG/1
0.4 CAPSULE ORAL
Qty: 90 CAPSULE | Refills: 3 | Status: SHIPPED | OUTPATIENT
Start: 2018-09-12 | End: 2019-03-25 | Stop reason: SDUPTHER

## 2018-09-12 NOTE — LETTER
September 12, 2018     Brielle Haynes, [address] [id number]    Patient: Alyssa Bond   YOB: 1955   Date of Visit: 9/12/2018       Dear Dr Astrid Duggan:    Thank you for referring Viktor Orozco to me for evaluation  Below are my notes for this consultation  If you have questions, please do not hesitate to call me  I look forward to following your patient along with you  Sincerely,        Stefan Cranker, MD        CC: No Recipients  Stefan Cranker, MD  9/12/2018  9:47 AM  Sign at close encounter  Progress Note - Urology  Alyssa Bond 61 y o  female MRN: [id number]  Encounter: [id number]      Chief Complaint:   Chief Complaint   Patient presents with    Urinary Incontinence     6 Month Follow Up       HPI:     80-year-old female seen for voiding dysfunction  She has been caring increased residuals  BUN and creatinine are normal was that estimated GFR of 97 mL/minute  Residuals have been 180 and 240 mL  She has had sporadic UTI  She is on vaginal estrogen cream twice weekly  A trial of Flomax has not made any appreciable difference  We discussed possible use of a inter stem trial after cystometric studies would be obtained  However she has been moving into the [place] area and will need to find a urologist in that vicinity  MEDS:    Current Outpatient Prescriptions:     acetaminophen (TYLENOL ARTHRITIS PAIN) 650 mg CR tablet, Take 650 mg by mouth daily, Disp: , Rfl:     Acetaminophen 325 MG CAPS, Take 650 mg by mouth, Disp: , Rfl:     ascorbic acid (VITAMIN C) 500 mg tablet, Take 500 mg by mouth daily  , Disp: , Rfl:     Ascorbic Acid 100 MG CHEW, Chew 500 mg, Disp: , Rfl:     Calcium-Vitamin D (CALTRATE 600 PLUS-VIT D PO), Take 1 tablet by mouth daily  , Disp: , Rfl:     cetirizine-pseudoephedrine (ZyrTEC-D) 5-120 MG per tablet, Take 1 tablet by mouth 2 (two) times a day, Disp: 60 tablet, Rfl: 3    Cholecalciferol (VITAMIN D3) 5000 UNITS CAPS, Take 5,000 Units by mouth daily  , Disp: , Rfl:     Cholecalciferol 1000 UNIT/10ML LIQD, Take 1,000 Units by mouth, Disp: , Rfl:     [START ON 9/13/2018] conjugated estrogens (PREMARIN) vaginal cream, Insert 1 g into the vagina 2 (two) times a week, Disp: 42 5 g, Rfl: 11    Cranberry 1000 MG CAPS, Take 1,000 mg by mouth daily  , Disp: , Rfl:     fluticasone (VERAMYST) 27 5 MCG/SPRAY nasal spray, 2 sprays into each nostril daily as needed  , Disp: , Rfl:     Multiple Vitamins-Minerals (BARIATRIC FUSION PO), Take by mouth, Disp: , Rfl:     NON FORMULARY, Apply 1 application topically daily at bedtime [employer] eye ointment to right eye, every night  , Disp: , Rfl:     potassium chloride 0 3 mEq/mL injection, Take 10 mEq by mouth, Disp: , Rfl:     tamsulosin (FLOMAX) 0 4 mg, Take 1 capsule (0 4 mg total) by mouth daily after dinner for 90 days, Disp: 90 capsule, Rfl: 3    alendronate (FOSAMAX) 70 mg tablet, Take 70 mg by mouth, Disp: , Rfl:     aspirin 81 MG tablet, Take 81 mg by mouth, Disp: , Rfl:     Biotin 5000 MCG CAPS, Take by mouth, Disp: , Rfl:     calcium-vitamin D (OSCAL) 250-125 MG-UNIT per tablet, Take 2 tablets by mouth, Disp: , Rfl:     cetirizine (ZYRTEC ALLERGY) 10 mg tablet, Take by mouth, Disp: , Rfl:     omeprazole (PriLOSEC) 20 mg delayed release capsule, TAKE ONE CAPSULE BY MOUTH EVERY DAY (Patient not taking: Reported on 9/12/2018), Disp: 90 capsule, Rfl: 0    sodium chloride (OCEAN) 0 65 % nasal spray, 2 sprays into each nostril 2 (two) times a day, Disp: , Rfl:     Zoledronic Acid (RECLAST IV), Infuse into a venous catheter One infusion yearly last dose in august , Disp: , Rfl:       PMH:  Past Medical History:   Diagnosis Date    Ankle pain, left     Arthritis     Edema extremities     left leg    Eye injury     right approx 15 yrs ago    Food allergy     bell peppers    History of pneumonia     Hyperlipidemia     Knee pain, left     Obesity     Osteoporosis     Seasonal allergies     Urinary incontinence     Wears contact lenses     left eye    Wears glasses     Wears partial dentures     lower         PSH  Past Surgical History:   Procedure Laterality Date    COLONOSCOPY N/A 4/12/2016    Procedure: COLONOSCOPY;  Surgeon: Merlin Lulas, MD;  Location: [facility]; Service:     FOOT SURGERY      KNEE SURGERY      NEUROPLASTY / TRANSPOSITION MEDIAN NERVE AT CARPAL TUNNEL Bilateral     OOPHORECTOMY Left     ORTHOPEDIC SURGERY      Knee,     [place] EGD TRANSORAL BIOPSY SINGLE/MULTIPLE N/A 11/30/2016    Procedure: ESOPHAGOGASTRODUODENOSCOPY (EGD) WITH POSSIBLE BIOPSY;  Surgeon: Danya Gasca MD;  Location: [facility]; Service: Bariatrics    [place] LAP, ABHILASH RESTRICT PROC, LONGITUDINAL GASTRECTOMY N/A 2/28/2017    Procedure: GASTRECTOMY SLEEVE LAPAROSCOPIC with repair para-esophageal hernia with mesh;  Surgeon: Danya Gasca MD;  Location: [facility] OR;  Service: Bariatrics    SHOULDER ARTHROSCOPY W/ ROTATOR CUFF REPAIR Right     TUBAL LIGATION           ROS:  Review of Systems      Vitals:  Blood pressure 120/78, pulse 80, height 5' 2" (1 575 m), weight 72 6 kg (160 lb), not currently breastfeeding  Physical Exam:     General Appearance    Well-developed female in no acute distress  Abdomen   Soft, nontender, no masses, no organomegaly  Back   No CVA tenderness  Genitalia   Normal vaginal introitus for age  There is no discharge    No significant prolapse  Extremities    No edema  Neurologic   Grossly physiologic    Lab, Imaging and other studies:  Recent Results (from the past 672 hour(s))   POCT urine dip    Collection Time: 09/12/18  9:13 AM   Result Value Ref Range    LEUKOCYTE ESTERASE,UA +     NITRITE,UA neg     SL AMB POCT UROBILINOGEN neg     SL AMB POCT URINE PROTEIN neg      PH,UA 8      BLOOD,UA neg     SPECIFIC GRAVITY,UA 1 000     KETONES,UA neg      BILIRUBIN,UA neg     GLUCOSE, UA neg      COLOR,UA yellow      CLARITY,UA clear    POCT Measure PVR    Collection Time: 09/12/18  9:31 AM   Result Value Ref Range    POST-VOID RESIDUAL VOLUME, ML  mL           IMPRESSION:   voiding dysfunction with increased postvoid residual  , normal renal function    PLAN:   at the present we will continue her current management  She is moving to the [place] area  I had recommended the bladder function cystometric studies to be obtained    And consideration of a PNE trial

## 2018-09-12 NOTE — PROGRESS NOTES
Progress Note - Urology  Jose Angel Mccauley 61 y o  female MRN: [id number]  Encounter: [id number]      Chief Complaint:   Chief Complaint   Patient presents with    Urinary Incontinence     6 Month Follow Up       HPI:     70-year-old female seen for voiding dysfunction  She has been caring increased residuals  BUN and creatinine are normal was that estimated GFR of 97 mL/minute  Residuals have been 180 and 240 mL  She has had sporadic UTI  She is on vaginal estrogen cream twice weekly  A trial of Flomax has not made any appreciable difference  We discussed possible use of a inter stem trial after cystometric studies would be obtained  However she has been moving into the [place] area and will need to find a urologist in that vicinity  MEDS:    Current Outpatient Prescriptions:     acetaminophen (TYLENOL ARTHRITIS PAIN) 650 mg CR tablet, Take 650 mg by mouth daily, Disp: , Rfl:     Acetaminophen 325 MG CAPS, Take 650 mg by mouth, Disp: , Rfl:     ascorbic acid (VITAMIN C) 500 mg tablet, Take 500 mg by mouth daily  , Disp: , Rfl:     Ascorbic Acid 100 MG CHEW, Chew 500 mg, Disp: , Rfl:     Calcium-Vitamin D (CALTRATE 600 PLUS-VIT D PO), Take 1 tablet by mouth daily  , Disp: , Rfl:     cetirizine-pseudoephedrine (ZyrTEC-D) 5-120 MG per tablet, Take 1 tablet by mouth 2 (two) times a day, Disp: 60 tablet, Rfl: 3    Cholecalciferol (VITAMIN D3) 5000 UNITS CAPS, Take 5,000 Units by mouth daily  , Disp: , Rfl:     Cholecalciferol 1000 UNIT/10ML LIQD, Take 1,000 Units by mouth, Disp: , Rfl:     [START ON 9/13/2018] conjugated estrogens (PREMARIN) vaginal cream, Insert 1 g into the vagina 2 (two) times a week, Disp: 42 5 g, Rfl: 11    Cranberry 1000 MG CAPS, Take 1,000 mg by mouth daily  , Disp: , Rfl:     fluticasone (VERAMYST) 27 5 MCG/SPRAY nasal spray, 2 sprays into each nostril daily as needed  , Disp: , Rfl:     Multiple Vitamins-Minerals (BARIATRIC FUSION PO), Take by mouth, Disp: , Rfl:     NON FORMULARY, Apply 1 application topically daily at bedtime [employer] eye ointment to right eye, every night  , Disp: , Rfl:     potassium chloride 0 3 mEq/mL injection, Take 10 mEq by mouth, Disp: , Rfl:     tamsulosin (FLOMAX) 0 4 mg, Take 1 capsule (0 4 mg total) by mouth daily after dinner for 90 days, Disp: 90 capsule, Rfl: 3    alendronate (FOSAMAX) 70 mg tablet, Take 70 mg by mouth, Disp: , Rfl:     aspirin 81 MG tablet, Take 81 mg by mouth, Disp: , Rfl:     Biotin 5000 MCG CAPS, Take by mouth, Disp: , Rfl:     calcium-vitamin D (OSCAL) 250-125 MG-UNIT per tablet, Take 2 tablets by mouth, Disp: , Rfl:     cetirizine (ZYRTEC ALLERGY) 10 mg tablet, Take by mouth, Disp: , Rfl:     omeprazole (PriLOSEC) 20 mg delayed release capsule, TAKE ONE CAPSULE BY MOUTH EVERY DAY (Patient not taking: Reported on 9/12/2018), Disp: 90 capsule, Rfl: 0    sodium chloride (OCEAN) 0 65 % nasal spray, 2 sprays into each nostril 2 (two) times a day, Disp: , Rfl:     Zoledronic Acid (RECLAST IV), Infuse into a venous catheter One infusion yearly last dose in august , Disp: , Rfl:       PMH:  Past Medical History:   Diagnosis Date    Ankle pain, left     Arthritis     Edema extremities     left leg    Eye injury     right approx 15 yrs ago    Food allergy     bell peppers    History of pneumonia     Hyperlipidemia     Knee pain, left     Obesity     Osteoporosis     Seasonal allergies     Urinary incontinence     Wears contact lenses     left eye    Wears glasses     Wears partial dentures     lower         PSH  Past Surgical History:   Procedure Laterality Date    COLONOSCOPY N/A 4/12/2016    Procedure: COLONOSCOPY;  Surgeon: Harley Ontiveros MD;  Location: [facility] GI LAB;   Service:     FOOT SURGERY      KNEE SURGERY      NEUROPLASTY / TRANSPOSITION MEDIAN NERVE AT CARPAL TUNNEL Bilateral     OOPHORECTOMY Left     ORTHOPEDIC SURGERY      Knee,     [place] EGD TRANSORAL BIOPSY SINGLE/MULTIPLE N/A 11/30/2016    Procedure: ESOPHAGOGASTRODUODENOSCOPY (EGD) WITH POSSIBLE BIOPSY;  Surgeon: Jaylon Tapia MD;  Location: [facility]; Service: Bariatrics    NM LAP, ABHILASH RESTRICT PROC, LONGITUDINAL GASTRECTOMY N/A 2/28/2017    Procedure: GASTRECTOMY SLEEVE LAPAROSCOPIC with repair para-esophageal hernia with mesh;  Surgeon: Jaylon Tapia MD;  Location[facility];  Service: Bariatrics    SHOULDER ARTHROSCOPY W/ ROTATOR CUFF REPAIR Right     TUBAL LIGATION           ROS:  Review of Systems      Vitals:  Blood pressure 120/78, pulse 80, height 5' 2" (1 575 m), weight 72 6 kg (160 lb), not currently breastfeeding  Physical Exam:     General Appearance    Well-developed female in no acute distress  Abdomen   Soft, nontender, no masses, no organomegaly  Back   No CVA tenderness  Genitalia   Normal vaginal introitus for age  There is no discharge  No significant prolapse  Extremities    No edema  Neurologic   Grossly physiologic    Lab, Imaging and other studies:  Recent Results (from the past 672 hour(s))   POCT urine dip    Collection Time: 09/12/18  9:13 AM   Result Value Ref Range    LEUKOCYTE ESTERASE,UA +     NITRITE,UA neg     SL AMB POCT UROBILINOGEN neg     SL AMB POCT URINE PROTEIN neg      PH,UA 8      BLOOD,UA neg     SPECIFIC GRAVITY,UA 1 000     KETONES,UA neg      BILIRUBIN,UA neg     GLUCOSE, UA neg      COLOR,UA yellow      CLARITY,UA clear    POCT Measure PVR    Collection Time: 09/12/18  9:31 AM   Result Value Ref Range    POST-VOID RESIDUAL VOLUME, ML  mL           IMPRESSION:   voiding dysfunction with increased postvoid residual  , normal renal function    PLAN:   at the present we will continue her current management  She is moving to the [place] area  I had recommended the bladder function cystometric studies to be obtained    And consideration of a PNE trial

## 2018-09-19 DIAGNOSIS — J30.2 SEASONAL ALLERGIC RHINITIS, UNSPECIFIED TRIGGER: ICD-10-CM

## 2018-09-19 RX ORDER — HYDROCODONE BITARTRATE AND ACETAMINOPHEN 5; 300 MG/1; MG/1
5-300 TABLET ORAL EVERY 6 HOURS PRN
Refills: 0 | COMMUNITY
Start: 2018-07-13 | End: 2018-09-27 | Stop reason: ALTCHOICE

## 2018-09-20 RX ORDER — CETIRIZINE HCL/PSEUDOEPHEDRINE 5 MG-120MG
TABLET, EXTENDED RELEASE 12 HR ORAL
Qty: 48 TABLET | Refills: 3 | Status: SHIPPED | OUTPATIENT
Start: 2018-09-20 | End: 2018-09-27 | Stop reason: ALTCHOICE

## 2018-09-27 ENCOUNTER — OFFICE VISIT (OUTPATIENT)
Dept: FAMILY MEDICINE CLINIC | Facility: CLINIC | Age: 63
End: 2018-09-27
Payer: COMMERCIAL

## 2018-09-27 VITALS
DIASTOLIC BLOOD PRESSURE: 82 MMHG | SYSTOLIC BLOOD PRESSURE: 122 MMHG | BODY MASS INDEX: 29.26 KG/M2 | RESPIRATION RATE: 12 BRPM | HEART RATE: 78 BPM | WEIGHT: 159 LBS | HEIGHT: 62 IN

## 2018-09-27 DIAGNOSIS — J30.1 SEASONAL ALLERGIC RHINITIS DUE TO POLLEN: ICD-10-CM

## 2018-09-27 DIAGNOSIS — N39.46 MIXED INCONTINENCE: ICD-10-CM

## 2018-09-27 DIAGNOSIS — K91.2 POSTSURGICAL MALABSORPTION: ICD-10-CM

## 2018-09-27 DIAGNOSIS — Z23 NEED FOR PROPHYLACTIC VACCINATION AND INOCULATION AGAINST INFLUENZA: Primary | ICD-10-CM

## 2018-09-27 DIAGNOSIS — L71.9 ROSACEA: ICD-10-CM

## 2018-09-27 DIAGNOSIS — M81.0 OSTEOPOROSIS, UNSPECIFIED OSTEOPOROSIS TYPE, UNSPECIFIED PATHOLOGICAL FRACTURE PRESENCE: ICD-10-CM

## 2018-09-27 PROBLEM — Z90.3 POSTGASTRECTOMY MALABSORPTION: Status: ACTIVE | Noted: 2018-07-09

## 2018-09-27 PROCEDURE — 90682 RIV4 VACC RECOMBINANT DNA IM: CPT | Performed by: NURSE PRACTITIONER

## 2018-09-27 PROCEDURE — 90471 IMMUNIZATION ADMIN: CPT | Performed by: NURSE PRACTITIONER

## 2018-09-27 PROCEDURE — 1036F TOBACCO NON-USER: CPT | Performed by: NURSE PRACTITIONER

## 2018-09-27 PROCEDURE — 99214 OFFICE O/P EST MOD 30 MIN: CPT | Performed by: NURSE PRACTITIONER

## 2018-09-27 RX ORDER — CETIRIZINE HYDROCHLORIDE, PSEUDOEPHEDRINE HYDROCHLORIDE 5; 120 MG/1; MG/1
1 TABLET, FILM COATED, EXTENDED RELEASE ORAL 2 TIMES DAILY
Qty: 60 TABLET | Refills: 4 | Status: SHIPPED | OUTPATIENT
Start: 2018-09-27

## 2018-09-27 NOTE — PROGRESS NOTES
Chief Complaint   Patient presents with    Follow-up     Assessment/Plan:  1  Need for prophylactic vaccination and inoculation against influenza  Given today  - influenza vaccine, [id number], quadrivalent, recombinant, PF, 0 5 mL, for patients 18 yr+ (FLUBLOK)    2  Mixed incontinence  This is controlled with the tamulosis    3  Rosacea  No  meds for this  4  Osteoporosis, unspecified osteoporosis type, unspecified pathological fracture presence  reclast    5  Postsurgical malabsorption  S/p bariatric surgery  On vitamins for this  rto prn     Subjective:      Patient ID: Derrick Salter is a 61 y o  female  Here today to ad on several chronic issues prior to moving to [place]  Is moving in about 3 weeks  Saw her gyn recently and had her premarin and tamulosin renewed  Remains on bariatric vitamins for S/P bariatric surgery  Has no issues today   The following portions of the patient's history were reviewed and updated as appropriate: allergies, current medications, past family history, past medical history, past social history, past surgical history and problem list     Past Medical History:   Diagnosis Date    Ankle pain, left     Arthritis     Chronic lymphadenitis     Edema extremities     left leg    Eye injury     right approx 15 yrs ago    Food allergy     bell peppers    History of pneumonia     Hyperlipidemia     Knee pain, left     Obesity     Osteoporosis     last assessed 2/5/16, resolved 2/5/16    Seasonal allergies     Urinary incontinence     Wears contact lenses     left eye    Wears glasses     Wears partial dentures     lower     Past Surgical History:   Procedure Laterality Date    COLONOSCOPY N/A 4/12/2016    Procedure: COLONOSCOPY;  Surgeon: Jeffery Louis MD;  Location: [facility];   Service:     FOOT SURGERY      KNEE SURGERY      NEUROPLASTY / TRANSPOSITION MEDIAN NERVE AT CARPAL TUNNEL Bilateral     OOPHORECTOMY Left     ORTHOPEDIC SURGERY Knee,     VA EGD TRANSORAL BIOPSY SINGLE/MULTIPLE N/A 11/30/2016    Procedure: ESOPHAGOGASTRODUODENOSCOPY (EGD) WITH POSSIBLE BIOPSY;  Surgeon: Danya Gasca MD;  Location: [facility]; Service: Bariatrics    VA LAP, ABHILASH RESTRICT PROC, LONGITUDINAL GASTRECTOMY N/A 2/28/2017    Procedure: GASTRECTOMY SLEEVE LAPAROSCOPIC with repair para-esophageal hernia with mesh;  Surgeon: Danya Gasca MD;  Location: [facility];  Service: Bariatrics    SHOULDER ARTHROSCOPY W/ ROTATOR CUFF REPAIR Right     TUBAL LIGATION       Family History   Problem Relation Age of Onset    Diabetes Father     Heart disease Father     Cancer Family     Endometriosis Family     Osteoporosis Family      Social History   Social History     Social History    Marital status: /Civil Union     Spouse name: N/A    Number of children: N/A    Years of education: N/A     Occupational History    Retired      Social History Main Topics    Smoking status: Former Smoker     Packs/day: 1 00     Years: 15 00     Quit date: 4/12/1989    Smokeless tobacco: Never Used    Alcohol use Yes      Comment: occasionally / minimal to none    Drug use: No    Sexual activity: Not on file     Other Topics Concern    Not on file     Social History Narrative    Caffeine use, 2-3 cups iced tea    Uses safety equipment, denied, smoke detectors    Uses seatbelt      Review of Systems   Constitutional: Negative  Respiratory: Negative  Cardiovascular: Negative  Musculoskeletal: Negative  Skin: Negative  Neurological: Negative  Psychiatric/Behavioral: Negative  Vitals:    09/27/18 1330   BP: 122/82   BP Location: Left arm   Patient Position: Sitting   Pulse: 78   Resp: 12   Weight: 72 1 kg (159 lb)   Height: 5' 2" (1 575 m)       Objective:   Wt Readings from Last 3 Encounters:   09/27/18 72 1 kg (159 lb)   09/12/18 72 6 kg (160 lb)   07/09/18 76 kg (167 lb 8 oz)     BP Readings from Last 3 Encounters:   09/27/18 122/82 09/12/18 120/78   07/09/18 104/70     Pulse Readings from Last 3 Encounters:   09/27/18 78   09/12/18 80   07/09/18 85     BMI Readings from Last 3 Encounters:   09/27/18 29 08 kg/m²   09/12/18 29 26 kg/m²   07/09/18 30 64 kg/m²     Office Visit on 09/12/2018   Component Date Value Ref Range Status    LEUKOCYTE ESTERASE,UA 09/12/2018 +   Final    NITRITE,UA 09/12/2018 neg   Final    SL AMB POCT UROBILINOGEN 09/12/2018 neg   Final    SL AMB POCT URINE PROTEIN 09/12/2018 neg   Final     PH,UA 09/12/2018 8   Final     BLOOD,UA 09/12/2018 neg   Final    SPECIFIC GRAVITY,UA 09/12/2018 1 000   Final    KETONES,UA 09/12/2018 neg   Final     BILIRUBIN,UA 09/12/2018 neg   Final    GLUCOSE, UA 09/12/2018 neg   Final     COLOR,UA 09/12/2018 yellow   Final     CLARITY,UA 09/12/2018 clear   Final    POST-VOID RESIDUAL VOLUME, ML POC 09/12/2018 243  mL Final   Appointment on 06/04/2018   Component Date Value Ref Range Status    WBC 06/04/2018 4 97  4 31 - 10 16 Thousand/uL Final    RBC 06/04/2018 4 32  3 81 - 5 12 Million/uL Final    Hemoglobin 06/04/2018 13 6  11 5 - 15 4 g/dL Final    Hematocrit 06/04/2018 41 1  34 8 - 46 1 % Final    MCV 06/04/2018 95  82 - 98 fL Final    MCH 06/04/2018 31 5  26 8 - 34 3 pg Final    MCHC 06/04/2018 33 1  31 4 - 37 4 g/dL Final    RDW 06/04/2018 12 3  11 6 - 15 1 % Final    Platelets 18/36/6021 226  149 - 390 Thousands/uL Final    MPV 06/04/2018 9 9  8 9 - 12 7 fL Final    Sodium 06/04/2018 139  136 - 145 mmol/L Final    Potassium 06/04/2018 4 3  3 5 - 5 3 mmol/L Final    Chloride 06/04/2018 102  100 - 108 mmol/L Final    CO2 06/04/2018 31  21 - 32 mmol/L Final    ANION GAP 06/04/2018 6  4 - 13 mmol/L Final    BUN 06/04/2018 10  5 - 25 mg/dL Final    Creatinine 06/04/2018 0 62  0 60 - 1 30 mg/dL Final    Standardized to IDMS reference method    Glucose, Fasting 06/04/2018 87  65 - 99 mg/dL Final      Specimen collection should occur prior to Sulfasalazine administration due to the potential for falsely depressed results  Specimen collection should occur prior to Sulfapyridine administration due to the potential for falsely elevated results   Calcium 06/04/2018 9 4  8 3 - 10 1 mg/dL Final    AST 06/04/2018 17  5 - 45 U/L Final      Specimen collection should occur prior to Sulfasalazine administration due to the potential for falsely depressed results   ALT 06/04/2018 35  12 - 78 U/L Final      Specimen collection should occur prior to Sulfasalazine and/or Sulfapyridine administration due to the potential for falsely depressed results   Alkaline Phosphatase 06/04/2018 100  46 - 116 U/L Final    Total Protein 06/04/2018 7 4  6 4 - 8 2 g/dL Final    Albumin 06/04/2018 4 3  3 5 - 5 0 g/dL Final    Total Bilirubin 06/04/2018 0 52  0 20 - 1 00 mg/dL Final    eGFR 06/04/2018 97  ml/min/1 73sq m Final    Ferritin 06/04/2018 151  8 - 388 ng/mL Final    Folate 06/04/2018 >20 0* 3 1 - 17 5 ng/mL Final    Cholesterol 06/04/2018 216* 50 - 200 mg/dL Final      Cholesterol:       Desirable         <200 mg/dl       Borderline         200-239 mg/dl       High              >239           Triglycerides 06/04/2018 70  <=150 mg/dL Final    Specimen collection should occur prior to N-Acetylcysteine or Metamizole administration due to the potential for falsely depressed results   HDL, Direct 06/04/2018 97* 40 - 60 mg/dL Final      HDL Cholesterol:       High    >59 mg/dL       Low     <41 mg/dL    LDL Calculated 06/04/2018 105* 0 - 100 mg/dL Final      This screening LDL is a calculated result  It does not have the accuracy of the Direct Measured LDL in the monitoring of patients with hyperlipidemia and/or statin therapy  Direct Measure LDL (EDH073) must be ordered separately in these patients      Non-HDL-Chol (CHOL-HDL) 06/04/2018 119  mg/dl Final    PTH 06/04/2018 46 6  18 4 - 80 1 pg/mL Final    Vitamin A 06/04/2018 43 2  36 4 - 108 0 ug/dL Final Reference intervals for vitamin A determined from [facility] and  Nutrition Examination Survey, 9797-7508  Individuals with vitamin A  less than 20 ug/dL are considered vitamin A deficient and those with  serum concentrations less than 10 ug/dL are considered severely  deficient  This test was developed and its performance characteristics  determined by [facility]  It has not been cleared or approved  by the Food and Drug Administration   Vitamin B1, Whole Blood 06/04/2018 157 4  66 5 - 200 0 nmol/L Final    This test was developed and its performance characteristics  determined by [facility]  It has not been cleared or approved  by the Food and Drug Administration   Vitamin B-12 06/04/2018 1111* 100 - 900 pg/mL Final    Vit D, 25-Hydroxy 06/04/2018 45 7  30 0 - 100 0 ng/mL Final   Office Visit on 03/05/2018   Component Date Value Ref Range Status    LEUKOCYTE ESTERASE,UA 03/05/2018 ++   Final    NITRITE,UA 03/05/2018 neg   Final    SL AMB POCT URINE PROTEIN 03/05/2018 trace   Final     PH,UA 03/05/2018 8   Final    KETONES,UA 03/05/2018 neg   Final    GLUCOSE, UA 03/05/2018 neg   Final     COLOR,UA 03/05/2018 yellow   Final     CLARITY,UA 03/05/2018 clear   Final   Generic Conversion - Encounter on 11/09/2017   Component Date Value Ref Range Status    Color, UA 11/09/2017 Yellow   Final    Clarity, UA 11/09/2017 Hazy   Final    Leukocytes, UA 11/09/2017 MODERATE   Final    Nitrite, UA 11/09/2017 NEG   Final    Blood, UA 11/09/2017 NEG   Final    Bilirubin, UA 11/09/2017 NEG   Final    Urobilinogen, UA 11/09/2017 0 2   Final    Protein, UA 11/09/2017 NEG   Final    pH, UA 11/09/2017 6 0   Final    Specific [employer], UA 11/09/2017 1 010   Final    Ketones, UA 11/09/2017 NEG   Final    Glucose 11/09/2017 NEG   Final    Comment:   Performed at:  In Office  ,     Lab Requisition on 11/09/2017   Component Date Value Ref Range Status    Urine Culture 11/09/2017 >100,000 cfu/ml Enterobacter cloacae*  Final   Lab Requisition on 07/15/2017   Component Date Value Ref Range Status    Throat Culture 07/15/2017 Negative for beta-hemolytic Streptococcus   Final   [employer] Office Visit on 07/15/2017   Component Date Value Ref Range Status    RAPID STREP A SCREEN 07/15/2017 Negative   Final    Comment:   Performed at: In Office  ,     Ancillary Orders on 06/13/2017   Component Date Value Ref Range Status    WBC 08/17/2017 4 54  4 31 - 10 16 Thousand/uL Final    RBC 08/17/2017 4 22  3 81 - 5 12 Million/uL Final    Hemoglobin 08/17/2017 13 1  11 5 - 15 4 g/dL Final    Hematocrit 08/17/2017 39 0  34 8 - 46 1 % Final    MCV 08/17/2017 92  82 - 98 fL Final    MCH 08/17/2017 31 0  26 8 - 34 3 pg Final    MCHC 08/17/2017 33 6  31 4 - 37 4 g/dL Final    RDW 08/17/2017 13 1  11 6 - 15 1 % Final    Platelets 64/97/6859 232  149 - 390 Thousands/uL Final    MPV 08/17/2017 10 0  8 9 - 12 7 fL Final    Sodium 08/17/2017 139  136 - 145 mmol/L Final    Potassium 08/17/2017 4 0  3 5 - 5 3 mmol/L Final    Chloride 08/17/2017 105  100 - 108 mmol/L Final    CO2 08/17/2017 28  21 - 32 mmol/L Final    ANION GAP 08/17/2017 6  4 - 13 mmol/L Final    BUN 08/17/2017 11  5 - 25 mg/dL Final    Creatinine 08/17/2017 0 58* 0 60 - 1 30 mg/dL Final    Standardized to IDMS reference method    Glucose, Fasting 08/17/2017 107* 65 - 99 mg/dL Final      Specimen collection should occur prior to Sulfasalazine administration due to the potential for falsely depressed results  Specimen collection should occur prior to Sulfapyridine administration due to the potential for falsely elevated results   Calcium 08/17/2017 9 5  8 3 - 10 1 mg/dL Final    AST 08/17/2017 28  5 - 45 U/L Final      Specimen collection should occur prior to Sulfasalazine administration due to the potential for falsely depressed results       ALT 08/17/2017 66  12 - 78 U/L Final      Specimen collection should occur prior to Sulfasalazine and/or Sulfapyridine administration due to the potential for falsely depressed results   Alkaline Phosphatase 08/17/2017 122* 46 - 116 U/L Final    Total Protein 08/17/2017 7 0  6 4 - 8 2 g/dL Final    Albumin 08/17/2017 3 9  3 5 - 5 0 g/dL Final    Total Bilirubin 08/17/2017 0 41  0 20 - 1 00 mg/dL Final    eGFR 08/17/2017 99  ml/min/1 73sq m Final    Ferritin 08/17/2017 187  8 - 388 ng/mL Final    Folate 08/17/2017 >20 0* 3 1 - 17 5 ng/mL Final    Cholesterol 08/17/2017 216* 50 - 200 mg/dL Final    Triglycerides 08/17/2017 132  <=150 mg/dL Final    Specimen collection should occur prior to N-Acetylcysteine or Metamizole administration due to the potential for falsely depressed results   HDL, Direct 08/17/2017 74* 40 - 60 mg/dL Final    Specimen collection should occur prior to Metamizole administration due to the potential for falsley depressed results      LDL Calculated 08/17/2017 116* 0 - 100 mg/dL Final    PTH 08/17/2017 44 6  14 0 - 72 0 pg/mL Final    Vitamin A 08/17/2017 52  26 - 82 ug/dL Final    Vitamin B1, Whole Blood 08/17/2017 150 1  66 5 - 200 0 nmol/L Final    Vitamin B-12 08/17/2017 1992* 100 - 900 pg/mL Final    Vit D, 25-Hydroxy 08/17/2017 51 6  30 0 - 100 0 ng/mL Final   Admission on 02/28/2017, Discharged on 03/02/2017   Component Date Value Ref Range Status    Case Report 02/28/2017    Final                    Value:Surgical Pathology Report                         Case: [id number]                                   Authorizing Provider:  Laurie Stratton MD        Collected:           02/28/2017 1403              Ordering Location:     [facility]        Received:            02/28/2017 [facility] Operating Room                                                     Pathologist:           Tolu Rain MD                                                                Specimen:    Stomach, Portion of stomach  Final Diagnosis 02/28/2017    Final                    Value: This result contains rich text formatting which cannot be displayed here   Note 02/28/2017    Final                    Value: This result contains rich text formatting which cannot be displayed here   Additional Information 02/28/2017    Final                    Value: This result contains rich text formatting which cannot be displayed here  Karen Pappas Description 02/28/2017    Final                    Value: This result contains rich text formatting which cannot be displayed here   Clinical Information 02/28/2017    Final                    Value:Hyperlipidemia     Morbid (severe) obesity due to excess calories   Sodium 03/01/2017 139  136 - 145 mmol/L Final    Potassium 03/01/2017 3 6  3 5 - 5 3 mmol/L Final    Chloride 03/01/2017 103  100 - 108 mmol/L Final    CO2 03/01/2017 26  21 - 32 mmol/L Final    ANION GAP 03/01/2017 10  4 - 13 mmol/L Final    BUN 03/01/2017 8  5 - 25 mg/dL Final    Creatinine 03/01/2017 0 53* 0 60 - 1 30 mg/dL Final    Standardized to IDMS reference method    Glucose 03/01/2017 116  65 - 140 mg/dL Final    If the patient is fasting, the ADA then defines impaired fasting glucose as > 100 mg/dL and diabetes as > or equal to 123 mg/dL      Calcium 03/01/2017 8 4  8 3 - 10 1 mg/dL Final    eGFR 03/01/2017 >60 0  ml/min/1 73sq m Final    WBC 03/01/2017 9 51  4 31 - 10 16 Thousand/uL Final    RBC 03/01/2017 4 05  3 81 - 5 12 Million/uL Final    Hemoglobin 03/01/2017 12 6  11 5 - 15 4 g/dL Final    Hematocrit 03/01/2017 36 8  34 8 - 46 1 % Final    MCV 03/01/2017 91  82 - 98 fL Final    MCH 03/01/2017 31 1  26 8 - 34 3 pg Final    MCHC 03/01/2017 34 2  31 4 - 37 4 g/dL Final    RDW 03/01/2017 12 3  11 6 - 15 1 % Final    Platelets 83/26/5437 222  149 - 390 Thousands/uL Final    MPV 03/01/2017 10 5  8 9 - 12 7 fL Final   Admission on 11/30/2016, Discharged on 11/30/2016   Component Date Value Ref Range Status    Case Report 11/30/2016    Final                    Value:Surgical Pathology Report                         Case: [id number]                                   Authorizing Provider:  Mei Gamble MD        Collected:           11/30/2016 2366              Ordering Location:     [facility]        Received:            11/30/2016 [facility]                                                          Pathologist:           Fito Cornelius MD                                                          Specimen:    Stomach, Antral bx, R/O H  Pylori                                                          Final Diagnosis 11/30/2016    Final                    Value: This result contains rich text formatting which cannot be displayed here   Additional Information 11/30/2016    Final                    Value: This result contains rich text formatting which cannot be displayed here  Darling Koch Gross Description 11/30/2016    Final                    Value: This result contains rich text formatting which cannot be displayed here  There may be more visits with results that are not included  Physical Exam   Constitutional: She is oriented to person, place, and time  She appears well-developed and well-nourished  Neck: Normal range of motion  Neck supple  No thyromegaly present  Cardiovascular: Normal rate, regular rhythm, S1 normal and normal heart sounds  Exam reveals no distant heart sounds  Pulmonary/Chest: Effort normal and breath sounds normal  No respiratory distress  She has no wheezes  Musculoskeletal: Normal range of motion  Neurological: She is alert and oriented to person, place, and time  Skin: Skin is warm and dry  Psychiatric: She has a normal mood and affect   Her behavior is normal  Judgment and thought content normal

## 2018-10-01 DIAGNOSIS — N95.2 ATROPHIC VAGINITIS: ICD-10-CM

## 2018-10-01 RX ORDER — CONJUGATED ESTROGENS 0.62 MG/G
CREAM VAGINAL
Qty: 30 G | Refills: 1 | Status: SHIPPED | OUTPATIENT
Start: 2018-10-01

## 2018-10-01 RX ORDER — CONJUGATED ESTROGENS 0.62 MG/G
CREAM VAGINAL
Qty: 30 G | Refills: 1 | Status: SHIPPED | OUTPATIENT
Start: 2018-10-01 | End: 2018-10-01 | Stop reason: SDUPTHER

## 2018-10-04 DIAGNOSIS — J30.1 SEASONAL ALLERGIC RHINITIS DUE TO POLLEN: Primary | ICD-10-CM

## 2018-10-04 RX ORDER — CETIRIZINE HYDROCHLORIDE, PSEUDOEPHEDRINE HYDROCHLORIDE 5; 120 MG/1; MG/1
1 TABLET, FILM COATED, EXTENDED RELEASE ORAL 2 TIMES DAILY
Qty: 180 TABLET | Refills: 1 | Status: SHIPPED | OUTPATIENT
Start: 2018-10-04

## 2019-03-25 DIAGNOSIS — N39.8 VOIDING DYSFUNCTION: ICD-10-CM

## 2019-03-25 NOTE — TELEPHONE ENCOUNTER
Provider - En Darnell  Location - [place]  Patient would like a refill of tamsulosin 0 4mg 90 day supply sent to new pharmacy with enough refills to get to September

## 2019-03-26 RX ORDER — TAMSULOSIN HYDROCHLORIDE 0.4 MG/1
0.4 CAPSULE ORAL
Qty: 90 CAPSULE | Refills: 3 | Status: SHIPPED | OUTPATIENT
Start: 2019-03-26 | End: 2019-06-24

## 2019-07-03 ENCOUNTER — TELEPHONE (OUTPATIENT)
Dept: BARIATRICS | Facility: CLINIC | Age: 64
End: 2019-07-03

## 2019-07-03 NOTE — TELEPHONE ENCOUNTER
lm for patient to schedule overdue f/u apt           ----- Message from Ney Velarde RN sent at 2019 12:27 PM EDT -----  Regardin year f/u appointment  Please call patient to schedule 2 year f/u appointment with our office  Thank you

## 2020-07-07 ENCOUNTER — TELEPHONE (OUTPATIENT)
Dept: BARIATRICS | Facility: CLINIC | Age: 65
End: 2020-07-07

## 2020-07-07 NOTE — TELEPHONE ENCOUNTER
-Lm for pt to call office and schedule appointment for annual---- Message from Lyle Rincon RN sent at 6/29/2020  8:31 AM EDT -----  Regarding: 3 year f/u appointment  Please contact patient to schedule a 3 year follow up appointment  Thank You

## (undated) DEVICE — SCD SEQUENTIAL COMPRESSION COMFORT SLEEVE MEDIUM KNEE LENGTH: Brand: KENDALL SCD

## (undated) DEVICE — CHLORAPREP HI-LITE 26ML ORANGE

## (undated) DEVICE — ENDOPATH XCEL BLADELESS TROCARS WITH STABILITY SLEEVES: Brand: ENDOPATH XCEL

## (undated) DEVICE — HARMONIC ACE +7 LAPAROSCOPIC SHEARS ADVANCED HEMOSTASIS 5MM DIAMETER 36CM SHAFT LENGTH  FOR USE WITH GRAY HAND PIECE ONLY: Brand: HARMONIC ACE

## (undated) DEVICE — TUBING SUCTION 5MM X 12 FT

## (undated) DEVICE — IRRIG ENDO FLO TUBING

## (undated) DEVICE — SPRAY SET ENDO 360DEG GAS ASSIST FLEX APPLICATOR DUPLOSPRAY

## (undated) DEVICE — PMI DISPOSABLE PUNCTURE CLOSURE DEVICE / SUTURE GRASPER: Brand: PMI

## (undated) DEVICE — TROCAR VISIPORT

## (undated) DEVICE — GLOVE SRG BIOGEL 7

## (undated) DEVICE — TIBURON LAPAROSCOPIC ABDOMINAL DRAPE: Brand: CONVERTORS

## (undated) DEVICE — REM POLYHESIVE ADULT PATIENT RETURN ELECTRODE: Brand: VALLEYLAB

## (undated) DEVICE — SUT MONOCRYL 4-0 PS-2 27 IN Y426H

## (undated) DEVICE — BLUE HEAT SCOPE WARMER

## (undated) DEVICE — INTENDED FOR TISSUE SEPARATION, AND OTHER PROCEDURES THAT REQUIRE A SHARP SURGICAL BLADE TO PUNCTURE OR CUT.: Brand: BARD-PARKER SAFETY BLADES SIZE 15, STERILE

## (undated) DEVICE — VIOLET BRAIDED (POLYGLACTIN 910), SYNTHETIC ABSORBABLE SUTURE: Brand: COATED VICRYL

## (undated) DEVICE — WEBRIL 6 IN UNSTERILE

## (undated) DEVICE — ENDOPATH XCEL UNIVERSAL TROCAR STABLILITY SLEEVES: Brand: ENDOPATH XCEL

## (undated) DEVICE — SYRINGE 30ML LL

## (undated) DEVICE — SEAMGUARD STPL REINF ENDO GIA ULTRA UNV 60 BLACK

## (undated) DEVICE — LAP AEM SCISSOR TIP .75 IN

## (undated) DEVICE — TISSEEL FIBRIN 4ML FROZEN

## (undated) DEVICE — TRAVELKIT CONTAINS FIRST STEP KIT (200ML EP-4 KIT) AND SOILED SCOPE BAG - 1 KIT: Brand: TRAVELKIT CONTAINS FIRST STEP KIT AND SOILED SCOPE BAG

## (undated) DEVICE — ADHESIVE SKN CLSR HISTOACRYL FLEX 0.5ML LF

## (undated) DEVICE — VISIGI 3D®  CALIBRATION SYSTEM  SIZE 36FR SLEEVE/STD: Brand: BOEHRINGER® VISIGI 3D™ SLEEVE GASTRECTOMY CALIBRATION SYSTEM, SIZE 36FR

## (undated) DEVICE — NEEDLE HYPO 22G X 1-1/2 IN

## (undated) DEVICE — ALLENTOWN LAP CHOLE APP PACK: Brand: CARDINAL HEALTH

## (undated) DEVICE — COVIDIEN GIA BLACK (XTRA THICK) RELOAD

## (undated) DEVICE — STAPLER GIA HANDLE STAND 4MM

## (undated) DEVICE — GLOVE SRG BIOGEL 8

## (undated) DEVICE — URETERAL CATHETER ADAPTOR TIP

## (undated) DEVICE — 3000CC GUARDIAN II: Brand: GUARDIAN

## (undated) DEVICE — SURGICAL GOWN, XL SMARTSLEEVE: Brand: CONVERTORS

## (undated) DEVICE — TUBING SMOKE EVAC W/FILTRATION DEVICE PLUMEPORT ACTIV

## (undated) DEVICE — GLOVE SRG BIOGEL 7.5

## (undated) DEVICE — COVIDIEN ENDO GIA PURPLE (MED) RELOAD 45MM

## (undated) DEVICE — [HIGH FLOW INSUFFLATOR,  DO NOT USE IF PACKAGE IS DAMAGED,  KEEP DRY,  KEEP AWAY FROM SUNLIGHT,  PROTECT FROM HEAT AND RADIOACTIVE SOURCES.]: Brand: PNEUMOSURE

## (undated) DEVICE — GLOVE INDICATOR PI UNDERGLOVE SZ 7 BLUE

## (undated) DEVICE — AEM CORD

## (undated) DEVICE — COVIDIEN ENDO GIA PURPLE (MED) RELOAD 60MM

## (undated) DEVICE — SUT ETHIBOND 0 CT-1 30 IN X424H

## (undated) DEVICE — SEAMGUARD STPL REINF ENDO GIA ULTRA UNIV 60 PURPLE